# Patient Record
Sex: MALE | Race: WHITE | Employment: OTHER | URBAN - METROPOLITAN AREA
[De-identification: names, ages, dates, MRNs, and addresses within clinical notes are randomized per-mention and may not be internally consistent; named-entity substitution may affect disease eponyms.]

---

## 2020-02-17 ENCOUNTER — TELEPHONE (OUTPATIENT)
Dept: PHARMACY | Age: 70
End: 2020-02-17

## 2020-02-17 NOTE — TELEPHONE ENCOUNTER
Received signed referral from Dr Stanley Reese , pt transferring from [facility] . Scheduled initial appt on 3/12 asked pt to arrive 15 minutes early to register and bring list of medications.

## 2020-03-12 ENCOUNTER — ANTI-COAG VISIT (OUTPATIENT)
Dept: PHARMACY | Age: 70
End: 2020-03-12
Payer: MEDICAID

## 2020-03-12 PROBLEM — I82.409 DVT OF LEG (DEEP VENOUS THROMBOSIS) (HCC): Status: ACTIVE | Noted: 2020-03-12

## 2020-03-12 PROBLEM — I26.99 PE (PULMONARY THROMBOEMBOLISM) (HCC): Status: ACTIVE | Noted: 2020-03-12

## 2020-03-12 LAB — INTERNATIONAL NORMALIZATION RATIO, POC: 1.7

## 2020-03-12 PROCEDURE — 85610 PROTHROMBIN TIME: CPT

## 2020-03-12 PROCEDURE — 99213 OFFICE O/P EST LOW 20 MIN: CPT

## 2020-03-12 RX ORDER — GABAPENTIN 300 MG/1
300 CAPSULE ORAL 2 TIMES DAILY
COMMUNITY
Start: 2020-03-05

## 2020-03-12 RX ORDER — CHOLECALCIFEROL (VITAMIN D3) 1250 MCG
3000 CAPSULE ORAL DAILY
COMMUNITY

## 2020-03-12 RX ORDER — WARFARIN SODIUM 10 MG/1
10 TABLET ORAL DAILY
COMMUNITY
Start: 2020-03-02

## 2020-03-12 RX ORDER — ATORVASTATIN CALCIUM 20 MG/1
20 TABLET, FILM COATED ORAL DAILY
COMMUNITY
Start: 2020-03-02

## 2020-03-12 NOTE — PROGRESS NOTES
Mr. Hemalatha Us is a 77 y.o. y/o male with history of DVT who presents today for anticoagulation monitoring and adjustment. Pertinent PMH: Has been on warfarin since 2015. Last [facility] Note: Results are therapeutic (INR goal = 2-3). Pt denies any specific changes at this time. Instructed patient to remain on warfarin 5 mg Sun & T and 10 mg all other days. Patient is to have his INR checked in 1 month (3/12/20). Unfortunately, our service will be closed by that time--pt intends to reach out to Dr. Elizabeth Landaverde office for further guidance, but was also provided with contact information for Trish Cunningham.     Patient Reported Findings:  Yes     No  [x]   []       Patient verifies current dosing regimen as listed- warfarin 5 mg Sun & Tues and 10 mg all other days  []   [x]       S/S bleeding/bruising/swelling/SOB- denies   []   [x]       Blood in urine or stool-denies   []   [x]       Procedures scheduled in the future at this time- April 13- colonoscopy, will need to call about holding/lovenox    []   [x]       Missed Dose- denies   []   [x]       Extra Dose- denies   []   [x]       Change in medications- updated med list   []   [x]       Change in health/diet/appetite- has greens twice weekly- broccoli and spinach and peas and sometimes iceburg lettuce, really likes dark green leafy vegetables- explained need for consistency---> had more greens than normal     []   [x]       Change in alcohol use- has a glass of wine occasionally   []   [x]       Change in activity  []   [x]       Hospital admission  []   [x]       Emergency department visit  []   [x]       Other complaints    Clinical Outcomes:  Yes     No  []   [x]       Major bleeding event  []   [x]       Thromboembolic event    Duration of warfarin Therapy: indefinite   INR Range:  2.0-3.0     Re-educated patient of signs and symptoms of bleeding and clotting, when to seek emergent care, the need to maintain a consistent diet and

## 2020-03-25 ENCOUNTER — TELEPHONE (OUTPATIENT)
Dept: PHARMACY | Age: 70
End: 2020-03-25

## 2020-03-25 NOTE — TELEPHONE ENCOUNTER
Called patient. Explained that we aren't seeing patients in the clinic until COVID-19 has passed then we will re-open. We are still asking patients to get their INRs checked by getting a lab draw at a designated lab ([facility]). We are asking that you get your lab drawn on the day of your appointment (time doesn't matter as long as you go during hours of operation), so please go sometime tomorrow to the lab to get your INR drawn. Then we will be contacting you via telephone with results and we will conduct the whole appointment over the telephone. Patient has selected [address] [place] [address] location to get his INR drawn. Agatha Ravi, PharmD, [facility]      Patient's line was busy, other line I was able to leave a . Will try again later.

## 2020-03-30 NOTE — TELEPHONE ENCOUNTER
Called patient re: lab draw for INR. He states he did not get the message from [employer] so, I explained the temporary closure of the clinic and the lab draw availability @ [facility] [place]. Patient agreed to go tomorrow to [facility] to get his INR drawn. Advised him to call with any questions or health changes.

## 2020-03-31 LAB — INR BLD: 2.2

## 2020-04-01 ENCOUNTER — ANTI-COAG VISIT (OUTPATIENT)
Dept: PHARMACY | Age: 70
End: 2020-04-01
Payer: MEDICAID

## 2020-04-01 PROCEDURE — 99211 OFF/OP EST MAY X REQ PHY/QHP: CPT

## 2020-04-01 NOTE — PROGRESS NOTES
Mr. Harjinder Coker is a 77 y.o. y/o male with history of DVT who presents today for anticoagulation monitoring and adjustment. Pertinent PMH: Has been on warfarin since 2015. Hx patient of [facility] clinic  Patient Reported Findings:  Yes     No  [x]   []       Patient verifies current dosing regimen as listed- warfarin 5 mg Sun & Tues and 10 mg all other days  []   [x]       S/S bleeding/bruising/swelling/SOB- denies   []   [x]       Blood in urine or stool-denies   []   [x]       Procedures scheduled in the future at this time- April 13- colonoscopy, will need to call about holding/lovenox --> cancelled d/t COVID19   []   [x]       Missed Dose- denies   []   [x]       Extra Dose- denies   []   [x]       Change in medications- updated med list   [x]   []       Change in health/diet/appetite- has greens twice weekly- broccoli and spinach and peas and sometimes iceburg lettuce, really likes dark green leafy vegetables- explained need for consistency---> had more greens than normal --> has been eating some greens recently      []   [x]       Change in alcohol use- has a glass of wine occasionally   []   [x]       Change in activity  []   [x]       Hospital admission  []   [x]       Emergency department visit  []   [x]       Other complaints    Clinical Outcomes:  Yes     No  []   [x]       Major bleeding event  []   [x]       Thromboembolic event    Duration of warfarin Therapy: indefinite   INR Range:  2.0-3.0     Has 10mg tablets of warfarin and uses a pillbox and takes in the morning. INR today is 2.2 via venipuncture     Continue weekly dose of 5 mg Sun & Tues and 10 mg all other days. Encouraged to maintain a consistency of vegetables/salads.   Recheck INR in 4 weeks, 4/28 at [facility] lab    Referring is Dr. Mandi Sullivan   INR (no units)   Date Value   03/31/2020 2.20   03/12/2020 1.7

## 2020-04-28 ENCOUNTER — ANTI-COAG VISIT (OUTPATIENT)
Dept: PHARMACY | Age: 70
End: 2020-04-28
Payer: MEDICAID

## 2020-04-28 LAB — INR BLD: 2.3

## 2020-04-28 PROCEDURE — 99211 OFF/OP EST MAY X REQ PHY/QHP: CPT

## 2020-05-28 ENCOUNTER — TELEPHONE (OUTPATIENT)
Dept: PHARMACY | Age: 70
End: 2020-05-28

## 2020-05-29 ENCOUNTER — ANTI-COAG VISIT (OUTPATIENT)
Dept: PHARMACY | Age: 70
End: 2020-05-29
Payer: MEDICAID

## 2020-05-29 LAB — INR BLD: 2.9

## 2020-05-29 PROCEDURE — 99211 OFF/OP EST MAY X REQ PHY/QHP: CPT

## 2020-05-29 NOTE — PROGRESS NOTES
[address]: No  Total # of Interventions Recommended: 0  - Maintenance Safety Lab Monitoring #: 1  Total Interventions Accepted: 0  Time Spent (min): 15    Soo JustinD

## 2020-06-17 ENCOUNTER — TELEPHONE (OUTPATIENT)
Dept: PHARMACY | Age: 70
End: 2020-06-17

## 2020-07-06 ENCOUNTER — ANTI-COAG VISIT (OUTPATIENT)
Dept: PHARMACY | Age: 70
End: 2020-07-06
Payer: MEDICAID

## 2020-07-06 VITALS — TEMPERATURE: 99.3 F

## 2020-07-06 LAB — INTERNATIONAL NORMALIZATION RATIO, POC: 2.2

## 2020-07-06 PROCEDURE — 85610 PROTHROMBIN TIME: CPT

## 2020-07-06 PROCEDURE — 99211 OFF/OP EST MAY X REQ PHY/QHP: CPT

## 2020-07-06 NOTE — PROGRESS NOTES
Only    PHSO: No  Total # of Interventions Recommended: 0  - Maintenance Safety Lab Monitoring #: 1  Total Interventions Accepted: 0  Time Spent (min): 15    Harleen Holden PharmD

## 2020-08-03 ENCOUNTER — ANTI-COAG VISIT (OUTPATIENT)
Dept: PHARMACY | Age: 70
End: 2020-08-03
Payer: MEDICARE

## 2020-08-03 VITALS — TEMPERATURE: 96.8 F

## 2020-08-03 LAB — INTERNATIONAL NORMALIZATION RATIO, POC: 2.5

## 2020-08-03 PROCEDURE — 85610 PROTHROMBIN TIME: CPT

## 2020-08-03 PROCEDURE — 99211 OFF/OP EST MAY X REQ PHY/QHP: CPT

## 2020-08-31 ENCOUNTER — ANTI-COAG VISIT (OUTPATIENT)
Dept: PHARMACY | Age: 70
End: 2020-08-31
Payer: MEDICARE

## 2020-08-31 VITALS — TEMPERATURE: 97.7 F

## 2020-08-31 LAB — INTERNATIONAL NORMALIZATION RATIO, POC: 2.3

## 2020-08-31 PROCEDURE — 99211 OFF/OP EST MAY X REQ PHY/QHP: CPT

## 2020-08-31 PROCEDURE — 85610 PROTHROMBIN TIME: CPT

## 2020-08-31 NOTE — PROGRESS NOTES
Mr. Ignacia Miranda is a 77 y.o. y/o male with history of DVT who presents today for anticoagulation monitoring and adjustment. Pertinent PMH: Has been on warfarin since 2015. Hx patient of [facility] clinic  Patient Reported Findings:  Yes     No  [x]   []       Patient verifies current dosing regimen as listed- warfarin 5 mg Sun & Tues and 10 mg all other days---> confirms dose   []   [x]       S/S bleeding/bruising/swelling/SOB- denies   []   [x]       Blood in urine or stool-denies   []   [x]       Procedures scheduled in the future at this time- April 13- colonoscopy, will need to call about holding/lovenox --> cancelled d/t COVID19   []   [x]       Missed Dose- denies   []   [x]       Extra Dose- denies   []   [x]       Change in medications- denies   []   [x]       Change in health/diet/appetite- has greens twice weekly- broccoli and spinach and peas and sometimes iceburg lettuce, really likes dark green leafy vegetables- explained need for consistency---> had more greens than normal---> continue with greens        []   [x]       Change in alcohol use- has a glass of wine occasionally --> has a beer once a month   []   [x]       Change in activity  []   [x]       Hospital admission  []   [x]       Emergency department visit  []   [x]       Other complaints    Clinical Outcomes:  Yes     No  []   [x]       Major bleeding event  []   [x]       Thromboembolic event    Duration of warfarin Therapy: indefinite   INR Range:  2.0-3.0     Has 10mg tablets of warfarin and uses a pillbox and takes in the morning. INR today is 2.3  Continue weekly dose of 5 mg Sun & Tues and 10 mg all other days. Encouraged to maintain a consistency of vegetables/salads.   Recheck INR in 4 weeks, 9/28    Referring is Dr. Ashley Mcknight   INR (no units)   Date Value   08/03/2020 2.5   07/06/2020 2.2   05/29/2020 2.90   04/28/2020 2.30   03/31/2020 2.20   03/12/2020 1.7     CLINICAL PHARMACY CONSULT: MED RECONCILIATION/REVIEW

## 2020-10-01 ENCOUNTER — TELEPHONE (OUTPATIENT)
Dept: PHARMACY | Age: 70
End: 2020-10-01

## 2020-10-05 ENCOUNTER — ANTI-COAG VISIT (OUTPATIENT)
Dept: PHARMACY | Age: 70
End: 2020-10-05
Payer: MEDICARE

## 2020-10-05 VITALS — TEMPERATURE: 97.3 F

## 2020-10-05 LAB — INTERNATIONAL NORMALIZATION RATIO, POC: 3.4

## 2020-10-05 PROCEDURE — 85610 PROTHROMBIN TIME: CPT

## 2020-10-05 PROCEDURE — 99211 OFF/OP EST MAY X REQ PHY/QHP: CPT

## 2020-10-05 NOTE — PROGRESS NOTES
Mr. Ad Meléndez is a 77 y.o. y/o male with history of DVT who presents today for anticoagulation monitoring and adjustment. Pertinent PMH: Has been on warfarin since 2015. Hx patient of [facility] clinic  Patient Reported Findings:  Yes     No  [x]   []       Patient verifies current dosing regimen as listed- warfarin 5 mg Sun & Tues and 10 mg all other days---> confirms dose   []   [x]       S/S bleeding/bruising/swelling/SOB- denies   []   [x]       Blood in urine or stool-denies   []   [x]       Procedures scheduled in the future at this time- April 13- colonoscopy, will need to call about holding/lovenox --> cancelled d/t COVID19   []   [x]       Missed Dose- denies   []   [x]       Extra Dose- denies   []   [x]       Change in medications- denies --> no changes  []   [x]       Change in health/diet/appetite- has greens twice weekly- broccoli and spinach and peas and sometimes iceburg lettuce, really likes dark green leafy vegetables- explained need for consistency---> had more greens than normal---> continue with greens --> patient to increase to 2 days of greens per week   []   [x]       Change in alcohol use- has a glass of wine occasionally --> has a beer once a month   []   [x]       Change in activity  []   [x]       Hospital admission  []   [x]       Emergency department visit  []   [x]       Other complaints    Clinical Outcomes:  Yes     No  []   [x]       Major bleeding event  []   [x]       Thromboembolic event    Duration of warfarin Therapy: indefinite   INR Range:  2.0-3.0     Has 10mg tablets of warfarin and uses a pillbox and takes in the morning. INR today is 3.4 d/t unknown etiology  Hold tomorrow, then continue weekly dose of 5 mg Sun & Tues and 10 mg all other days. Patient adding one more day of greens in per week starting today, assess at next visit. Encouraged to maintain a consistency of vegetables/salads.   Recheck INR in 3 weeks, 10/26    Referring is Dr. Ursula Haynes

## 2020-11-04 ENCOUNTER — TELEPHONE (OUTPATIENT)
Dept: PHARMACY | Age: 70
End: 2020-11-04

## 2020-11-05 ENCOUNTER — ANTI-COAG VISIT (OUTPATIENT)
Dept: PHARMACY | Age: 70
End: 2020-11-05
Payer: MEDICARE

## 2020-11-05 VITALS — TEMPERATURE: 97.8 F

## 2020-11-05 LAB — INTERNATIONAL NORMALIZATION RATIO, POC: 2.3

## 2020-11-05 PROCEDURE — 85610 PROTHROMBIN TIME: CPT

## 2020-11-05 PROCEDURE — 99211 OFF/OP EST MAY X REQ PHY/QHP: CPT

## 2020-11-05 NOTE — PROGRESS NOTES
Mr. Saira Brown is a 72 y.o. y/o male with history of DVT who presents today for anticoagulation monitoring and adjustment. Pertinent PMH: Has been on warfarin since 2015. Hx patient of [facility] clinic  Patient Reported Findings:  Yes     No  [x]   []       Patient verifies current dosing regimen as listed- warfarin 5 mg Sun & Tues and 10 mg all other days---> confirms dose   []   [x]       S/S bleeding/bruising/swelling/SOB- denies   []   [x]       Blood in urine or stool-denies   []   [x]       Procedures scheduled in the future at this time   []   [x]       Missed Dose- denies   []   [x]       Extra Dose- denies   []   [x]       Change in medications- denies --> no changes--> Vit C started 10/15 (may dec inr)  []   [x]       Change in health/diet/appetite- has greens twice weekly- broccoli and spinach and peas and sometimes iceburg lettuce, really likes dark green leafy vegetables- explained need for consistency---> had more greens than normal---> continue with greens --> patient to increase to 2 days of greens per week--> no change  []   [x]       Change in alcohol use- has a glass of wine occasionally --> has a beer once a month    []   [x]       Change in activity  []   [x]       Hospital admission  []   [x]       Emergency department visit  []   [x]       Other complaints    Clinical Outcomes:  Yes     No  []   [x]       Major bleeding event  []   [x]       Thromboembolic event    Duration of warfarin Therapy: indefinite   INR Range:  2.0-3.0     Has 10mg tablets of warfarin and uses a pillbox and takes in the morning. INR today is 2.3 today  Continue weekly dose of 5 mg Sun & Tues and 10 mg all other days. Encouraged to maintain a consistency of vegetables/salads.   Recheck INR in 4 weeks, 12/3    Referring is Dr. Sarkis Mas   INR (no units)   Date Value   11/05/2020 2.3   10/05/2020 3.4   08/31/2020 2.3   08/03/2020 2.5   05/29/2020 2.90   04/28/2020 2.30   03/31/2020 2.20     CLINICAL PHARMACY CONSULT: MED RECONCILIATION/REVIEW ADDENDUM    For Pharmacy Admin Tracking Only    PHSO: No  Total # of Interventions Recommended: 1  - Refills Provided #: 1  - Maintenance Safety Lab Monitoring #: 1  Total Interventions Accepted: 1  Time Spent (min): Sherly Stark, PharmD

## 2020-12-03 ENCOUNTER — ANTI-COAG VISIT (OUTPATIENT)
Dept: PHARMACY | Age: 70
End: 2020-12-03
Payer: MEDICARE

## 2020-12-03 VITALS — TEMPERATURE: 97.5 F

## 2020-12-03 LAB — INTERNATIONAL NORMALIZATION RATIO, POC: 3.4

## 2020-12-03 PROCEDURE — 85610 PROTHROMBIN TIME: CPT

## 2020-12-03 PROCEDURE — 99211 OFF/OP EST MAY X REQ PHY/QHP: CPT

## 2020-12-03 NOTE — PROGRESS NOTES
08/31/2020 2.3   05/29/2020 2.90   04/28/2020 2.30   03/31/2020 2.20     CLINICAL PHARMACY CONSULT: MED RECONCILIATION/REVIEW ADDENDUM    For Pharmacy Admin Tracking Only    PHSO: No  Total # of Interventions Recommended: 0  - Maintenance Safety Lab Monitoring #: 1  Total Interventions Accepted: 0  Time Spent (min): 15    Soo AndersonD

## 2020-12-24 ENCOUNTER — ANTI-COAG VISIT (OUTPATIENT)
Dept: PHARMACY | Age: 70
End: 2020-12-24
Payer: MEDICARE

## 2020-12-24 VITALS — TEMPERATURE: 96.4 F

## 2020-12-24 LAB — INTERNATIONAL NORMALIZATION RATIO, POC: 2.4

## 2020-12-24 PROCEDURE — 99211 OFF/OP EST MAY X REQ PHY/QHP: CPT

## 2020-12-24 PROCEDURE — 85610 PROTHROMBIN TIME: CPT

## 2020-12-24 NOTE — PROGRESS NOTES
Mr. Amarjit Marsh is a 72 y.o. y/o male with history of DVT who presents today for anticoagulation monitoring and adjustment. Pertinent PMH: Has been on warfarin since 2015. Hx patient of [facility] clinic  Patient Reported Findings:  Yes     No  [x]   []       Patient verifies current dosing regimen as listed- warfarin 5 mg Sun & Tues and 10 mg all other days---> confirms dose   []   [x]       S/S bleeding/bruising/swelling/SOB- denies   []   [x]       Blood in urine or stool-denies   []   [x]       Procedures scheduled in the future at this time   []   [x]       Missed Dose-   []   [x]       Extra Dose-   []   [x]       Change in medications- denies --> no changes--> Vit C started 10/15 (may dec inr)  []   [x]       Change in health/diet/appetite- has greens twice weekly- broccoli and spinach and peas and sometimes iceburg lettuce, really likes dark green leafy vegetables- explained need for consistency---> had more greens than normal---> continue with greens --> patient to increase to 2 days of greens per week--> eating greens 3 times a week and plans to continue, no NVD   []   [x]       Change in alcohol use- has a glass of wine occasionally --> has a beer once a month    []   [x]       Change in activity  []   [x]       Hospital admission  []   [x]       Emergency department visit  []   [x]       Other complaints    Clinical Outcomes:  Yes     No  []   [x]       Major bleeding event  []   [x]       Thromboembolic event    Duration of warfarin Therapy: indefinite   INR Range:  2.0-3.0     Has 10mg tablets of warfarin and uses a pillbox and takes in the morning. INR today is 2.4 after maintaining dose at last visit. Continue weekly dose of 5 mg Sun & Tues and 10 mg all other days. Encouraged to maintain a consistency of vegetables/salads.   Recheck INR in 4 weeks, 1/21    Referring is Dr. Sophia Gee   INR (no units)   Date Value   12/24/2020 2.4   12/03/2020 3.4   11/05/2020 2.3   10/05/2020 3.4 05/29/2020 2.90   04/28/2020 2.30   03/31/2020 2.20     CLINICAL PHARMACY CONSULT: MED RECONCILIATION/REVIEW ADDENDUM    For Pharmacy Admin Tracking Only    PHSO: No  Total # of Interventions Recommended: 0  - Maintenance Safety Lab Monitoring #: 1  Total Interventions Accepted: 0  Time Spent (min): 15    Soo AndersonD

## 2021-01-21 ENCOUNTER — ANTI-COAG VISIT (OUTPATIENT)
Dept: PHARMACY | Age: 71
End: 2021-01-21
Payer: MEDICARE

## 2021-01-21 DIAGNOSIS — I82.409 ACUTE DEEP VEIN THROMBOSIS (DVT) OF OTHER VEIN OF LOWER EXTREMITY, UNSPECIFIED LATERALITY (HCC): ICD-10-CM

## 2021-01-21 DIAGNOSIS — I26.99 PE (PULMONARY THROMBOEMBOLISM) (HCC): ICD-10-CM

## 2021-01-21 LAB — INTERNATIONAL NORMALIZATION RATIO, POC: 2.4

## 2021-01-21 PROCEDURE — 99211 OFF/OP EST MAY X REQ PHY/QHP: CPT

## 2021-01-21 PROCEDURE — 85610 PROTHROMBIN TIME: CPT

## 2021-01-21 RX ORDER — LISINOPRIL 5 MG/1
1 TABLET ORAL DAILY
COMMUNITY
Start: 2020-12-04

## 2021-01-21 NOTE — PROGRESS NOTES
Mr. Jone Driver is a 72 y.o. y/o male with history of DVT who presents today for anticoagulation monitoring and adjustment. Pertinent PMH: Has been on warfarin since 2015. Hx patient of [facility] clinic  Patient Reported Findings:  Yes     No  [x]   []       Patient verifies current dosing regimen as listed- warfarin 5 mg Sun & Tues and 10 mg all other days---> confirms dose   []   [x]       S/S bleeding/bruising/swelling/SOB- denies   []   [x]       Blood in urine or stool-denies   []   [x]       Procedures scheduled in the future at this time   []   [x]       Missed Dose-   []   [x]       Extra Dose-   [x]   []       Change in medications- Vit C started 10/15 (may dec inr) --> started lisinopril   []   [x]       Change in health/diet/appetite- has greens twice weekly- broccoli and spinach and peas and sometimes iceburg lettuce, really likes dark green leafy vegetables- explained need for consistency---> had more greens than normal---> continue with greens --> patient to increase to 2 days of greens per week--> eating greens 3 times a week and plans to continue, no NVD   []   [x]       Change in alcohol use- has a glass of wine occasionally --> has a beer once a month    []   [x]       Change in activity  []   [x]       Hospital admission  []   [x]       Emergency department visit  []   [x]       Other complaints    Clinical Outcomes:  Yes     No  []   [x]       Major bleeding event  []   [x]       Thromboembolic event    Duration of warfarin Therapy: indefinite   INR Range:  2.0-3.0     Has 10mg tablets of warfarin and uses a pillbox and takes in the morning. INR today is 2.4 again   Continue weekly dose of 5 mg Sun & Tues and 10 mg all other days. Encouraged to maintain a consistency of vegetables/salads.   Recheck INR in 4 weeks, 2/18    Referring is Dr. Thomas Martinez   INR (no units)   Date Value   12/24/2020 2.4   12/03/2020 3.4   11/05/2020 2.3   10/05/2020 3.4   05/29/2020 2.90   04/28/2020 2.30 03/31/2020 2.20     CLINICAL PHARMACY CONSULT: MED RECONCILIATION/REVIEW ADDENDUM    For Pharmacy Admin Tracking Only    PHSO: No  Total # of Interventions Recommended: 1  - Updated Order #: 1 Updated Order Reason(s):  Other  - Maintenance Safety Lab Monitoring #: 1  Total Interventions Accepted: 1  Time Spent (min): 15    Yoli Espinosa PharmD

## 2021-02-18 ENCOUNTER — ANTI-COAG VISIT (OUTPATIENT)
Dept: PHARMACY | Age: 71
End: 2021-02-18
Payer: MEDICARE

## 2021-02-18 VITALS — TEMPERATURE: 97.1 F

## 2021-02-18 DIAGNOSIS — I26.99 PE (PULMONARY THROMBOEMBOLISM) (HCC): ICD-10-CM

## 2021-02-18 DIAGNOSIS — I82.409 ACUTE DEEP VEIN THROMBOSIS (DVT) OF OTHER VEIN OF LOWER EXTREMITY, UNSPECIFIED LATERALITY (HCC): ICD-10-CM

## 2021-02-18 LAB — INTERNATIONAL NORMALIZATION RATIO, POC: 2.8

## 2021-02-18 PROCEDURE — 99211 OFF/OP EST MAY X REQ PHY/QHP: CPT

## 2021-02-18 PROCEDURE — 85610 PROTHROMBIN TIME: CPT

## 2021-02-18 NOTE — PROGRESS NOTES
Mr. Tank Barone is a 72 y.o. y/o male with history of DVT who presents today for anticoagulation monitoring and adjustment. Pertinent PMH: Has been on warfarin since 2015. Hx patient of [facility] clinic  Patient Reported Findings:  Yes     No  [x]   []       Patient verifies current dosing regimen as listed- warfarin 5 mg Sun & Tues and 10 mg all other days---> confirms dose   []   [x]       S/S bleeding/bruising/swelling/SOB- denies   []   [x]       Blood in urine or stool-denies   []   [x]       Procedures scheduled in the future at this time   []   [x]       Missed Dose-   []   [x]       Extra Dose-   []   [x]       Change in medications- Vit C started 10/15 (may dec inr) --> started lisinopril --> no changes    []   [x]       Change in health/diet/appetite- has greens twice weekly- broccoli and spinach and peas and sometimes iceburg lettuce, really likes dark green leafy vegetables- explained need for consistency---> had more greens than normal---> continue with greens --> patient to increase to 2 days of greens per week--> eating greens 3 times a week and plans to continue, no NVD   []   [x]       Change in alcohol use- has a glass of wine occasionally --> has a beer once a month    []   [x]       Change in activity  []   [x]       Hospital admission  []   [x]       Emergency department visit  []   [x]       Other complaints    Clinical Outcomes:  Yes     No  []   [x]       Major bleeding event  []   [x]       Thromboembolic event    Duration of warfarin Therapy: indefinite   INR Range:  2.0-3.0     Has 10mg tablets of warfarin and uses a pillbox and takes in the morning. INR today is 2.8  Continue weekly dose of 5 mg Sun & Tues and 10 mg all other days. Encouraged to maintain a consistency of vegetables/salads.   Refilled warfarin at op pharmacy  Recheck INR in 4 weeks, 3/18    Referring is Dr. Palomo Krsihnan   INR (no units)   Date Value   01/21/2021 2.4   12/24/2020 2.4   12/03/2020 3.4 11/05/2020 2.3   05/29/2020 2.90   04/28/2020 2.30   03/31/2020 2.20     CLINICAL PHARMACY CONSULT: MED RECONCILIATION/REVIEW ADDENDUM    For Pharmacy Admin Tracking Only    PHSO: No  Total # of Interventions Recommended: 1  - Refills Provided #: 1  - Maintenance Safety Lab Monitoring #: 1  Total Interventions Accepted: 1  Time Spent (min): 15    Soo RaeD

## 2021-03-01 ENCOUNTER — IMMUNIZATION (OUTPATIENT)
Dept: PRIMARY CARE CLINIC | Age: 71
End: 2021-03-01
Payer: MEDICARE

## 2021-03-01 PROCEDURE — 0011A COVID-19, MODERNA VACCINE 100MCG/0.5ML DOSE: CPT | Performed by: FAMILY MEDICINE

## 2021-03-01 PROCEDURE — 91301 COVID-19, MODERNA VACCINE 100MCG/0.5ML DOSE: CPT | Performed by: FAMILY MEDICINE

## 2021-03-25 ENCOUNTER — TELEPHONE (OUTPATIENT)
Dept: PHARMACY | Age: 71
End: 2021-03-25

## 2021-03-29 ENCOUNTER — ANTI-COAG VISIT (OUTPATIENT)
Dept: PHARMACY | Age: 71
End: 2021-03-29
Payer: MEDICARE

## 2021-03-29 ENCOUNTER — IMMUNIZATION (OUTPATIENT)
Dept: PRIMARY CARE CLINIC | Age: 71
End: 2021-03-29
Payer: MEDICARE

## 2021-03-29 DIAGNOSIS — I82.409 ACUTE DEEP VEIN THROMBOSIS (DVT) OF OTHER VEIN OF LOWER EXTREMITY, UNSPECIFIED LATERALITY (HCC): ICD-10-CM

## 2021-03-29 DIAGNOSIS — I26.99 PE (PULMONARY THROMBOEMBOLISM) (HCC): ICD-10-CM

## 2021-03-29 LAB — INTERNATIONAL NORMALIZATION RATIO, POC: 3.1

## 2021-03-29 PROCEDURE — 91301 COVID-19, MODERNA VACCINE 100MCG/0.5ML DOSE: CPT | Performed by: FAMILY MEDICINE

## 2021-03-29 PROCEDURE — 0012A COVID-19, MODERNA VACCINE 100MCG/0.5ML DOSE: CPT | Performed by: FAMILY MEDICINE

## 2021-03-29 PROCEDURE — 99211 OFF/OP EST MAY X REQ PHY/QHP: CPT

## 2021-03-29 PROCEDURE — 85610 PROTHROMBIN TIME: CPT

## 2021-03-29 NOTE — PROGRESS NOTES
Mr. Graciela Alex is a 72 y.o. y/o male with history of DVT who presents today for anticoagulation monitoring and adjustment. Pertinent PMH: Has been on warfarin since 2015. Hx patient of [facility] clinic  Patient Reported Findings:  Yes     No  [x]   []       Patient verifies current dosing regimen as listed- warfarin 5 mg Sun & Tues and 10 mg all other days---> confirms dose   []   [x]       S/S bleeding/bruising/swelling/SOB- denies   []   [x]       Blood in urine or stool-denies   []   [x]       Procedures scheduled in the future at this time   []   [x]       Missed Dose-   []   [x]       Extra Dose-   []   [x]       Change in medications- no changes    []   [x]       Change in health/diet/appetite- has greens twice weekly- broccoli and spinach and peas and sometimes iceburg lettuce, really likes dark green leafy vegetables- explained need for consistency---> had more greens than normal---> continue with greens --> patient to increase to 2 days of greens per week--> eating greens 3 times a week and plans to continue, no NVD   []   [x]       Change in alcohol use- has a glass of wine occasionally --> has a beer once a month    []   [x]       Change in activity  []   [x]       Hospital admission  []   [x]       Emergency department visit  []   [x]       Other complaints    Clinical Outcomes:  Yes     No  []   [x]       Major bleeding event  []   [x]       Thromboembolic event    Duration of warfarin Therapy: indefinite   INR Range:  2.0-3.0     Has 10mg tablets of warfarin and uses a pillbox and takes in the morning. INR today is 3.1  Continue weekly dose of 5 mg Sun & Tues and 10 mg all other days. Encouraged to maintain a consistency of vegetables/salads.   Recheck INR in 4 weeks, 4/26    Referring is Dr. Latha Neely   INR (no units)   Date Value   02/18/2021 2.8   01/21/2021 2.4   12/24/2020 2.4   12/03/2020 3.4   05/29/2020 2.90   04/28/2020 2.30   03/31/2020 2.20     CLINICAL PHARMACY CONSULT:

## 2021-05-03 ENCOUNTER — TELEPHONE (OUTPATIENT)
Dept: PHARMACY | Age: 71
End: 2021-05-03

## 2021-05-04 ENCOUNTER — ANTI-COAG VISIT (OUTPATIENT)
Dept: PHARMACY | Age: 71
End: 2021-05-04
Payer: MEDICARE

## 2021-05-04 DIAGNOSIS — I26.99 PE (PULMONARY THROMBOEMBOLISM) (HCC): ICD-10-CM

## 2021-05-04 DIAGNOSIS — I82.409 DEEP VEIN THROMBOSIS (DVT) OF LOWER EXTREMITY, UNSPECIFIED CHRONICITY, UNSPECIFIED LATERALITY, UNSPECIFIED VEIN (HCC): ICD-10-CM

## 2021-05-04 LAB — INTERNATIONAL NORMALIZATION RATIO, POC: 2.4

## 2021-05-04 PROCEDURE — 99211 OFF/OP EST MAY X REQ PHY/QHP: CPT

## 2021-05-04 PROCEDURE — 85610 PROTHROMBIN TIME: CPT

## 2021-05-04 NOTE — TELEPHONE ENCOUNTER
Warfarin prescription phoned into [facility] to [facility] under Dr. Prerna Fisher  Warfarin 10 mg tabs  Take 5 mg (10 mg x 0.5) every Sun, Tue; 10 mg (10 mg x 1) all other days  90 days   2 refills  Joyce Christopher, PharmD, [facility]

## 2021-05-04 NOTE — PROGRESS NOTES
Mr. Stephanie Jimenez is a 72 y.o. y/o male with history of DVT who presents today for anticoagulation monitoring and adjustment. Pertinent PMH: Has been on warfarin since 2015. Hx patient of [facility] clinic  Patient Reported Findings:  Yes     No  [x]   []       Patient verifies current dosing regimen as listed- warfarin 5 mg Sun & Tues and 10 mg all other days---> confirms dose   []   [x]       S/S bleeding/bruising/swelling/SOB- denies   []   [x]       Blood in urine or stool-denies   []   [x]       Procedures scheduled in the future at this time   []   [x]       Missed Dose-   []   [x]       Extra Dose-   []   [x]       Change in medications- no changes    []   [x]       Change in health/diet/appetite- has greens twice weekly- broccoli and spinach and peas and sometimes iceburg lettuce, really likes dark green leafy vegetables- explained need for consistency---> had more greens than normal---> continue with greens --> patient to increase to 2 days of greens per week--> eating greens 3 times a week and plans to continue, no NVD---> not as many greens recently, no NVD   []   [x]       Change in alcohol use- has a glass of wine occasionally --> has a beer once a month    []   [x]       Change in activity  []   [x]       Hospital admission  []   [x]       Emergency department visit  []   [x]       Other complaints    Clinical Outcomes:  Yes     No  []   [x]       Major bleeding event  []   [x]       Thromboembolic event    Duration of warfarin Therapy: indefinite   INR Range:  2.0-3.0     Has 10mg tablets of warfarin and uses a pillbox and takes in the morning. INR today is 2.4  Continue weekly dose of 5 mg Sun & Tues and 10 mg all other days. Encouraged to maintain a consistency of vegetables/salads. RF called into [facility].    Recheck INR in 4 weeks, 6/1    Referring is Dr. Lisset Gibbons   INR (no units)   Date Value   05/04/2021 2.4   03/29/2021 3.1   02/18/2021 2.8   01/21/2021 2.4   05/29/2020 2.90

## 2021-06-01 ENCOUNTER — ANTI-COAG VISIT (OUTPATIENT)
Dept: PHARMACY | Age: 71
End: 2021-06-01
Payer: MEDICARE

## 2021-06-01 DIAGNOSIS — I26.99 PE (PULMONARY THROMBOEMBOLISM) (HCC): ICD-10-CM

## 2021-06-01 DIAGNOSIS — I82.409 DEEP VEIN THROMBOSIS (DVT) OF LOWER EXTREMITY, UNSPECIFIED CHRONICITY, UNSPECIFIED LATERALITY, UNSPECIFIED VEIN (HCC): Primary | ICD-10-CM

## 2021-06-01 LAB — INTERNATIONAL NORMALIZATION RATIO, POC: 3.1

## 2021-06-01 PROCEDURE — 85610 PROTHROMBIN TIME: CPT

## 2021-06-01 PROCEDURE — 99211 OFF/OP EST MAY X REQ PHY/QHP: CPT

## 2021-06-01 NOTE — PROGRESS NOTES
Mr. Monique Aponte is a 72 y.o. y/o male with history of DVT who presents today for anticoagulation monitoring and adjustment. Pertinent PMH: Has been on warfarin since 2015. Hx patient of [facility] clinic  Patient Reported Findings:  Yes     No  [x]   []       Patient verifies current dosing regimen as listed- warfarin 5 mg Sun & Tues and 10 mg all other days---> confirms dose   []   [x]       S/S bleeding/bruising/swelling/SOB- denies   []   [x]       Blood in urine or stool-denies   []   [x]       Procedures scheduled in the future at this time   []   [x]       Missed Dose-   []   [x]       Extra Dose-   []   [x]       Change in medications- no changes    []   [x]       Change in health/diet/appetite- has greens twice weekly- broccoli and spinach and peas and sometimes iceburg lettuce, really likes dark green leafy vegetables- explained need for consistency---> had more greens than normal---> continue with greens --> patient to increase to 2 days of greens per week--> eating greens 3 times a week and plans to continue, no NVD---> not as many greens recently, no NVD---> went back to normal diet, no NVD   []   [x]       Change in alcohol use- has a glass of wine occasionally --> has a beer once a month    []   [x]       Change in activity  []   [x]       Hospital admission  []   [x]       Emergency department visit  []   [x]       Other complaints    Clinical Outcomes:  Yes     No  []   [x]       Major bleeding event  []   [x]       Thromboembolic event    Duration of warfarin Therapy: indefinite   INR Range:  2.0-3.0     Has 10mg tablets of warfarin and uses a pillbox and takes in the morning. INR today is 3.1  Continue weekly dose of 5 mg Sun & Tues and 10 mg all other days. Encouraged to maintain a consistency of vegetables/salads.   Recheck INR in 4 weeks, 6/29    Referring is Dr. Chester Bowers   INR (no units)   Date Value   06/01/2021 3.1   05/04/2021 2.4   03/29/2021 3.1   02/18/2021 2.8 05/29/2020 2.90   04/28/2020 2.30   03/31/2020 2.20     For Pharmacy Admin Tracking Only     Total # of Interventions Recommended: 0   Total # of Interventions Accepted: 0   Time Spent (min): 15

## 2021-07-08 ENCOUNTER — TELEPHONE (OUTPATIENT)
Dept: PHARMACY | Age: 71
End: 2021-07-08

## 2021-07-12 ENCOUNTER — ANTI-COAG VISIT (OUTPATIENT)
Dept: PHARMACY | Age: 71
End: 2021-07-12
Payer: MEDICARE

## 2021-07-12 DIAGNOSIS — I26.99 PE (PULMONARY THROMBOEMBOLISM) (HCC): ICD-10-CM

## 2021-07-12 DIAGNOSIS — I82.409 DEEP VEIN THROMBOSIS (DVT) OF LOWER EXTREMITY, UNSPECIFIED CHRONICITY, UNSPECIFIED LATERALITY, UNSPECIFIED VEIN (HCC): Primary | ICD-10-CM

## 2021-07-12 LAB — INTERNATIONAL NORMALIZATION RATIO, POC: 2.3

## 2021-07-12 PROCEDURE — 85610 PROTHROMBIN TIME: CPT

## 2021-07-12 PROCEDURE — 99211 OFF/OP EST MAY X REQ PHY/QHP: CPT

## 2021-07-12 NOTE — PROGRESS NOTES
Mr. Serafin Granados is a 72 y.o. y/o male with history of DVT who presents today for anticoagulation monitoring and adjustment. Pertinent PMH: Has been on warfarin since 2015. Hx patient of [facility] clinic  Patient Reported Findings:  Yes     No  [x]   []       Patient verifies current dosing regimen as listed- warfarin 5 mg Sun & Tues and 10 mg all other days---> confirms dose   []   [x]       S/S bleeding/bruising/swelling/SOB- denies   []   [x]       Blood in urine or stool-denies   []   [x]       Procedures scheduled in the future at this time   []   [x]       Missed Dose-   []   [x]       Extra Dose-   []   [x]       Change in medications- no changes    []   [x]       Change in health/diet/appetite- has greens twice weekly- broccoli and spinach and peas and sometimes iceburg lettuce, really likes dark green leafy vegetables- explained need for consistency---> had more greens than normal---> continue with greens --> patient to increase to 2 days of greens per week--> eating greens 3 times a week and plans to continue, no NVD---> not as many greens recently, no NVD---> went back to normal diet, no NVD---> no changes   []   [x]       Change in alcohol use- has a glass of wine occasionally --> has a beer once a month    []   [x]       Change in activity  []   [x]       Hospital admission  []   [x]       Emergency department visit  []   [x]       Other complaints    Clinical Outcomes:  Yes     No  []   [x]       Major bleeding event  []   [x]       Thromboembolic event    Duration of warfarin Therapy: indefinite   INR Range:  2.0-3.0     Has 10mg tablets of warfarin and uses a pillbox and takes in the morning. INR today is 2.3  Continue weekly dose of 5 mg Sun & Tues and 10 mg all other days. Encouraged to maintain a consistency of vegetables/salads.   Recheck INR in 4 weeks, 8/9    Referring is Dr. Carrie Mistry   INR (no units)   Date Value   07/12/2021 2.3   06/01/2021 3.1   05/04/2021 2.4   03/29/2021 3.1   05/29/2020 2.90   04/28/2020 2.30   03/31/2020 2.20     For Pharmacy Admin Tracking Only     Total # of Interventions Recommended: 0   Total # of Interventions Accepted: 0   Time Spent (min): 15

## 2021-08-09 ENCOUNTER — ANTI-COAG VISIT (OUTPATIENT)
Dept: PHARMACY | Age: 71
End: 2021-08-09
Payer: MEDICARE

## 2021-08-09 DIAGNOSIS — I26.99 PE (PULMONARY THROMBOEMBOLISM) (HCC): ICD-10-CM

## 2021-08-09 DIAGNOSIS — I82.409 DEEP VEIN THROMBOSIS (DVT) OF LOWER EXTREMITY, UNSPECIFIED CHRONICITY, UNSPECIFIED LATERALITY, UNSPECIFIED VEIN (HCC): Primary | ICD-10-CM

## 2021-08-09 LAB — INTERNATIONAL NORMALIZATION RATIO, POC: 2.7

## 2021-08-09 PROCEDURE — 99211 OFF/OP EST MAY X REQ PHY/QHP: CPT

## 2021-08-09 PROCEDURE — 85610 PROTHROMBIN TIME: CPT

## 2021-08-09 NOTE — PROGRESS NOTES
Mr. Michelle Moralez is a 72 y.o. y/o male with history of DVT who presents today for anticoagulation monitoring and adjustment. Pertinent PMH: Has been on warfarin since 2015. Hx patient of [facility] clinic  Patient Reported Findings:  Yes     No  [x]   []       Patient verifies current dosing regimen as listed- warfarin 5 mg Sun & Tues and 10 mg all other days---> confirms dose   []   [x]       S/S bleeding/bruising/swelling/SOB- denies   []   [x]       Blood in urine or stool-denies   []   [x]       Procedures scheduled in the future at this time   []   [x]       Missed Dose-   []   [x]       Extra Dose-   []   [x]       Change in medications- no changes    []   [x]       Change in health/diet/appetite- has greens twice weekly- broccoli and spinach and peas and sometimes iceburg lettuce, really likes dark green leafy vegetables- explained need for consistency---> had more greens than normal---> continue with greens --> patient to increase to 2 days of greens per week--> eating greens 3 times a week and plans to continue, no NVD---> not as many greens recently, no NVD---> went back to normal diet, no NVD---> no changes, no NVD   []   [x]       Change in alcohol use- has a glass of wine occasionally --> has a beer once a month    []   [x]       Change in activity  []   [x]       Hospital admission  []   [x]       Emergency department visit  []   [x]       Other complaints    Clinical Outcomes:  Yes     No  []   [x]       Major bleeding event  []   [x]       Thromboembolic event    Duration of warfarin Therapy: indefinite   INR Range:  2.0-3.0     Has 10mg tablets of warfarin and uses a pillbox and takes in the morning. INR today is 2.7  Continue weekly dose of 5 mg Sun & Tues and 10 mg all other days. Encouraged to maintain a consistency of vegetables/salads. RF called into [facility].   Recheck INR in 4 weeks, 9/7 dt holiday     Referring is Dr. Noah Bliss   INR (no units)   Date Value   08/09/2021 2.7 07/12/2021 2.3   06/01/2021 3.1   05/04/2021 2.4   05/29/2020 2.90   04/28/2020 2.30   03/31/2020 2.20   For Pharmacy Admin Tracking Only     Intervention Detail: Refill(s) Provided   Total # of Interventions Recommended: 1   Total # of Interventions Accepted: 1   Time Spent (min): 15

## 2021-09-07 ENCOUNTER — ANTI-COAG VISIT (OUTPATIENT)
Dept: PHARMACY | Age: 71
End: 2021-09-07
Payer: MEDICARE

## 2021-09-07 DIAGNOSIS — I82.409 DEEP VEIN THROMBOSIS (DVT) OF LOWER EXTREMITY, UNSPECIFIED CHRONICITY, UNSPECIFIED LATERALITY, UNSPECIFIED VEIN (HCC): Primary | ICD-10-CM

## 2021-09-07 DIAGNOSIS — I26.99 PE (PULMONARY THROMBOEMBOLISM) (HCC): ICD-10-CM

## 2021-09-07 LAB — INTERNATIONAL NORMALIZATION RATIO, POC: 3.6

## 2021-09-07 PROCEDURE — 99212 OFFICE O/P EST SF 10 MIN: CPT

## 2021-09-07 PROCEDURE — 85610 PROTHROMBIN TIME: CPT

## 2021-09-07 NOTE — PROGRESS NOTES
Mr. Deena Patino is a 72 y.o. y/o male with history of DVT who presents today for anticoagulation monitoring and adjustment. Pertinent PMH: Has been on warfarin since 2015. Hx patient of [facility] clinic  Patient Reported Findings:  Yes     No  [x]   []       Patient verifies current dosing regimen as listed- warfarin 5 mg Sun & Tues and 10 mg all other days---> confirms dose   []   [x]       S/S bleeding/bruising/swelling/SOB- denies   []   [x]       Blood in urine or stool-denies   []   [x]       Procedures scheduled in the future at this time   []   [x]       Missed Dose-   []   [x]       Extra Dose-   []   [x]       Change in medications- no changes    [x]   []       Change in health/diet/appetite- has greens twice weekly- broccoli and spinach and peas and sometimes iceburg lettuce, really likes dark green leafy vegetables- explained need for consistency---> continue with greens --> patient to increase to 2 days of greens per week--> eating greens 3 times a week and plans to continue, no NVD---> not as many greens recently, no NVD---> went back to normal diet, no NVD---> had tomatoes and asparagus once and broccoli once in the last week   []   [x]       Change in alcohol use- has a glass of wine occasionally --> has a beer once a month --> no alcohol    []   [x]       Change in activity  []   [x]       Hospital admission  []   [x]       Emergency department visit  []   [x]       Other complaints    Clinical Outcomes:  Yes     No  []   [x]       Major bleeding event  []   [x]       Thromboembolic event    Duration of warfarin Therapy: indefinite   INR Range:  2.0-3.0     Has 10mg tablets of warfarin and uses a pillbox and takes in the morning. INR today is 3.6  Hold dose tonight then continue weekly dose of 5 mg Sun & Tues and 10 mg all other days. Encouraged to maintain a consistency of vegetables/salads.   Recheck INR in 2 weeks,  9/21    Referring is Dr. Shara Lakhani   INR (no units)   Date Value 08/09/2021 2.7   07/12/2021 2.3   06/01/2021 3.1   05/04/2021 2.4   05/29/2020 2.90   04/28/2020 2.30   03/31/2020 2.20       For Pharmacy Admin Tracking Only     Intervention Detail: Dose Adjustment: 1, reason: Therapy Optimization   Total # of Interventions Recommended: 1   Total # of Interventions Accepted: 1   Time Spent (min): 15

## 2021-09-21 ENCOUNTER — ANTI-COAG VISIT (OUTPATIENT)
Dept: PHARMACY | Age: 71
End: 2021-09-21
Payer: MEDICARE

## 2021-09-21 DIAGNOSIS — I26.99 PE (PULMONARY THROMBOEMBOLISM) (HCC): ICD-10-CM

## 2021-09-21 DIAGNOSIS — I82.409 DEEP VEIN THROMBOSIS (DVT) OF LOWER EXTREMITY, UNSPECIFIED CHRONICITY, UNSPECIFIED LATERALITY, UNSPECIFIED VEIN (HCC): Primary | ICD-10-CM

## 2021-09-21 LAB — INTERNATIONAL NORMALIZATION RATIO, POC: 2.6

## 2021-09-21 PROCEDURE — 99211 OFF/OP EST MAY X REQ PHY/QHP: CPT

## 2021-09-21 PROCEDURE — 85610 PROTHROMBIN TIME: CPT

## 2021-09-21 NOTE — PROGRESS NOTES
Mr. Orion Horta is a 72 y.o. y/o male with history of DVT who presents today for anticoagulation monitoring and adjustment. Pertinent PMH: Has been on warfarin since 2015. Hx patient of [facility] clinic  Patient Reported Findings:  Yes     No  [x]   []       Patient verifies current dosing regimen as listed- warfarin 5 mg Sun & Tues and 10 mg all other days---> confirms dose   []   [x]       S/S bleeding/bruising/swelling/SOB- denies   []   [x]       Blood in urine or stool-denies   []   [x]       Procedures scheduled in the future at this time   []   [x]       Missed Dose-   []   [x]       Extra Dose-   []   [x]       Change in medications- no changes    []   [x]       Change in health/diet/appetite- has greens twice weekly- broccoli and spinach and peas and sometimes iceburg lettuce, really likes dark green leafy vegetables- explained need for consistency---> continue with greens --> patient to increase to 2 days of greens per week--> eating greens 3 times a week and plans to continue, no NVD---> not as many greens recently, no NVD---> went back to normal diet, no NVD---> had tomatoes and asparagus once and broccoli once in the last week --> no changes  []   [x]       Change in alcohol use- has a glass of wine occasionally --> has a beer once a month --> no alcohol    []   [x]       Change in activity  []   [x]       Hospital admission  []   [x]       Emergency department visit  []   [x]       Other complaints    Clinical Outcomes:  Yes     No  []   [x]       Major bleeding event  []   [x]       Thromboembolic event    Duration of warfarin Therapy: indefinite   INR Range:  2.0-3.0     Has 10mg tablets of warfarin and uses a pillbox and takes in the morning. INR today is 2.6  Continue weekly dose of 5mg on Sun and Tues and 10mg all other days. Encouraged to maintain a consistency of vegetables/salads.   Recheck INR in 4 weeks, 10/19     Referring is Dr. Paul Dwyer   INR (no units)   Date Value

## 2021-10-19 ENCOUNTER — ANTI-COAG VISIT (OUTPATIENT)
Dept: PHARMACY | Age: 71
End: 2021-10-19
Payer: COMMERCIAL

## 2021-10-19 DIAGNOSIS — I26.99 PE (PULMONARY THROMBOEMBOLISM) (HCC): ICD-10-CM

## 2021-10-19 DIAGNOSIS — I82.409 DEEP VEIN THROMBOSIS (DVT) OF LOWER EXTREMITY, UNSPECIFIED CHRONICITY, UNSPECIFIED LATERALITY, UNSPECIFIED VEIN (HCC): Primary | ICD-10-CM

## 2021-10-19 LAB — INTERNATIONAL NORMALIZATION RATIO, POC: 2.6

## 2021-10-19 PROCEDURE — 99211 OFF/OP EST MAY X REQ PHY/QHP: CPT

## 2021-10-19 PROCEDURE — 85610 PROTHROMBIN TIME: CPT

## 2021-10-19 NOTE — PROGRESS NOTES
Mr. Gail Galdamez is a 80 y.o. y/o male with history of DVT who presents today for anticoagulation monitoring and adjustment. Pertinent PMH: Has been on warfarin since 2015. Hx patient of [facility] clinic  Patient Reported Findings:  Yes     No  [x]   []       Patient verifies current dosing regimen as listed- warfarin 5 mg Sun & Tues and 10 mg all other days---> confirms dose   []   [x]       S/S bleeding/bruising/swelling/SOB- denies   []   [x]       Blood in urine or stool-denies   []   [x]       Procedures scheduled in the future at this time   []   [x]       Missed Dose-   []   [x]       Extra Dose-   []   [x]       Change in medications- no changes    []   [x]       Change in health/diet/appetite- has greens twice weekly- broccoli and spinach and peas and sometimes iceburg lettuce, really likes dark green leafy vegetables- explained need for consistency---> continue with greens --> patient to increase to 2 days of greens per week--> eating greens 3 times a week and plans to continue, no NVD---> had tomatoes and asparagus once and broccoli once in the last week --> no changes  []   [x]       Change in alcohol use- has a glass of wine occasionally --> has a beer once a month --> no alcohol    []   [x]       Change in activity  []   [x]       Hospital admission  []   [x]       Emergency department visit  []   [x]       Other complaints    Clinical Outcomes:  Yes     No  []   [x]       Major bleeding event  []   [x]       Thromboembolic event    Duration of warfarin Therapy: indefinite   INR Range:  2.0-3.0     Has 10mg tablets of warfarin and uses a pillbox and takes in the morning. INR today is 2.6 again  Continue weekly dose of 5mg on Sun and Tues and 10mg all other days. Encouraged to maintain a consistency of vegetables/salads.   [facility]in [facility]   Recheck INR in 4 weeks, 11/16      Referring is Dr. Renny Ryan   INR (no units)   Date Value   09/21/2021 2.6   09/07/2021 3.6 08/09/2021 2.7   07/12/2021 2.3   05/29/2020 2.90   04/28/2020 2.30   03/31/2020 2.20       For Pharmacy Admin Tracking Only     Intervention Detail: Refill(s) Provided   Total # of Interventions Recommended: 1   Total # of Interventions Accepted: 1   Time Spent (min): 15

## 2021-11-15 ENCOUNTER — ANTI-COAG VISIT (OUTPATIENT)
Dept: PHARMACY | Age: 71
End: 2021-11-15
Payer: COMMERCIAL

## 2021-11-15 DIAGNOSIS — I26.99 PE (PULMONARY THROMBOEMBOLISM) (HCC): ICD-10-CM

## 2021-11-15 DIAGNOSIS — I82.409 DEEP VEIN THROMBOSIS (DVT) OF LOWER EXTREMITY, UNSPECIFIED CHRONICITY, UNSPECIFIED LATERALITY, UNSPECIFIED VEIN (HCC): Primary | ICD-10-CM

## 2021-11-15 LAB — INTERNATIONAL NORMALIZATION RATIO, POC: 2.9

## 2021-11-15 PROCEDURE — 85610 PROTHROMBIN TIME: CPT

## 2021-11-15 PROCEDURE — 99211 OFF/OP EST MAY X REQ PHY/QHP: CPT

## 2021-11-15 NOTE — PROGRESS NOTES
Mr. Marycruz Batista is a 80 y.o. y/o male with history of DVT who presents today for anticoagulation monitoring and adjustment. Pertinent PMH: Has been on warfarin since 2015. Hx patient of [facility] clinic  Patient Reported Findings:  Yes     No  [x]   []       Patient verifies current dosing regimen as listed- warfarin 5 mg Sun & Tues and 10 mg all other days---> confirms dose   []   [x]       S/S bleeding/bruising/swelling/SOB- denies   []   [x]       Blood in urine or stool-denies   []   [x]       Procedures scheduled in the future at this time    []   [x]       Missed Dose- denies   []   [x]       Extra Dose-   []   [x]       Change in medications- no changes    []   [x]       Change in health/diet/appetite- has greens twice weekly- broccoli and spinach and peas and sometimes iceburg lettuce, really likes dark green leafy vegetables- explained need for consistency---> continue with greens --> patient to increase to 2 days of greens per week--> eating greens 3 times a week and plans to continue, no NVD---> had tomatoes and asparagus once and broccoli once in the last week --> no changes  []   [x]       Change in alcohol use- has a glass of wine occasionally --> has a beer once a month --> no alcohol    []   [x]       Change in activity  []   [x]       Hospital admission  []   [x]       Emergency department visit  []   [x]       Other complaints    Clinical Outcomes:  Yes     No  []   [x]       Major bleeding event  []   [x]       Thromboembolic event    Duration of warfarin Therapy: indefinite   INR Range:  2.0-3.0     Has 10mg tablets of warfarin and uses a pillbox and takes in the morning. INR today is 2.9  Continue weekly dose of 5mg on Sun and Tues and 10mg all other days. Encouraged to maintain a consistency of vegetables/salads.   Recheck INR in 4 weeks, 12/13      Patient consent signed 11/15/21    Referring is Dr. Porter Smith   INR (no units)   Date Value   10/19/2021 2.6   09/21/2021 2.6 09/07/2021 3.6   08/09/2021 2.7   05/29/2020 2.90   04/28/2020 2.30   03/31/2020 2.20       For Pharmacy Admin Tracking Only     Total # of Interventions Recommended: 0   Total # of Interventions Accepted: 0   Time Spent (min): 15

## 2021-12-20 ENCOUNTER — TELEPHONE (OUTPATIENT)
Dept: PHARMACY | Age: 71
End: 2021-12-20

## 2021-12-21 ENCOUNTER — ANTI-COAG VISIT (OUTPATIENT)
Dept: PHARMACY | Age: 71
End: 2021-12-21
Payer: COMMERCIAL

## 2021-12-21 DIAGNOSIS — I82.409 DEEP VEIN THROMBOSIS (DVT) OF LOWER EXTREMITY, UNSPECIFIED CHRONICITY, UNSPECIFIED LATERALITY, UNSPECIFIED VEIN (HCC): Primary | ICD-10-CM

## 2021-12-21 DIAGNOSIS — I26.99 PE (PULMONARY THROMBOEMBOLISM) (HCC): ICD-10-CM

## 2021-12-21 LAB — INTERNATIONAL NORMALIZATION RATIO, POC: 2.4

## 2021-12-21 PROCEDURE — 85610 PROTHROMBIN TIME: CPT

## 2021-12-21 PROCEDURE — 99211 OFF/OP EST MAY X REQ PHY/QHP: CPT

## 2021-12-21 NOTE — PROGRESS NOTES
Mr. Juan Carlos Bill is a 80 y.o. y/o male with history of DVT who presents today for anticoagulation monitoring and adjustment. Pertinent PMH: Has been on warfarin since 2015. Hx patient of [facility]  Patient Reported Findings:  Yes     No  [x]   []       Patient verifies current dosing regimen as listed- warfarin 5 mg Sun & Tues and 10 mg all other days---> confirms dose   []   [x]       S/S bleeding/bruising/swelling/SOB- denies   []   [x]       Blood in urine or stool-denies   []   [x]       Procedures scheduled in the future at this time    []   [x]       Missed Dose- denies   []   [x]       Extra Dose-   []   [x]       Change in medications- no changes    []   [x]       Change in health/diet/appetite- has greens twice weekly- broccoli and spinach and peas and sometimes iceburg lettuce, really likes dark green leafy vegetables- explained need for consistency---> continue with greens --> patient to increase to 2 days of greens per week--> eating greens 3 times a week and plans to continue, no NVD---> had tomatoes and asparagus once and broccoli once in the last week --> no changes  []   [x]       Change in alcohol use- has a glass of wine occasionally --> has a beer once a month --> no alcohol    []   [x]       Change in activity  []   [x]       Hospital admission  []   [x]       Emergency department visit  []   [x]       Other complaints    Clinical Outcomes:  Yes     No  []   [x]       Major bleeding event  []   [x]       Thromboembolic event    Duration of warfarin Therapy: indefinite   INR Range:  2.0-3.0     Has 10mg tablets of warfarin and uses a pillbox and takes in the morning. INR today is 2.4  Continue weekly dose of 5mg on Sun and Tues and 10mg all other days. Encouraged to maintain a consistency of vegetables/salads.   Recheck INR in 4 weeks, 1/18      Patient consent signed 11/15/21    Referring is Dr. Britni Black   INR (no units)   Date Value   12/21/2021 2.4   11/15/2021 2.9 10/19/2021 2.6   09/21/2021 2.6   05/29/2020 2.90   04/28/2020 2.30   03/31/2020 2.20       For Pharmacy Admin Tracking Only     Total # of Interventions Recommended: 0   Total # of Interventions Accepted: 0   Time Spent (min): 15

## 2022-01-21 ENCOUNTER — TELEPHONE (OUTPATIENT)
Dept: PHARMACY | Age: 72
End: 2022-01-21

## 2022-01-24 ENCOUNTER — ANTI-COAG VISIT (OUTPATIENT)
Dept: PHARMACY | Age: 72
End: 2022-01-24
Payer: COMMERCIAL

## 2022-01-24 DIAGNOSIS — I82.409 DEEP VEIN THROMBOSIS (DVT) OF LOWER EXTREMITY, UNSPECIFIED CHRONICITY, UNSPECIFIED LATERALITY, UNSPECIFIED VEIN (HCC): Primary | ICD-10-CM

## 2022-01-24 DIAGNOSIS — I26.99 PE (PULMONARY THROMBOEMBOLISM) (HCC): ICD-10-CM

## 2022-01-24 LAB — INTERNATIONAL NORMALIZATION RATIO, POC: 3.5

## 2022-01-24 PROCEDURE — 85610 PROTHROMBIN TIME: CPT

## 2022-01-24 PROCEDURE — 99211 OFF/OP EST MAY X REQ PHY/QHP: CPT

## 2022-01-24 NOTE — PROGRESS NOTES
Mr. Ivana Fabry is a 80 y.o. y/o male with history of DVT who presents today for anticoagulation monitoring and adjustment. Pertinent PMH: Has been on warfarin since 2015. Hx patient of [facility] clinic  Patient Reported Findings:  Yes     No  [x]   []       Patient verifies current dosing regimen as listed- warfarin 5 mg Sun & Tues and 10 mg all other days---> confirms dose   []   [x]       S/S bleeding/bruising/swelling/SOB- denies   []   [x]       Blood in urine or stool-denies   []   [x]       Procedures scheduled in the future at this time    []   [x]       Missed Dose - denies   []   [x]       Extra Dose - denies   []   [x]       Change in medications- no changes    []   [x]       Change in health/diet/appetite- has greens twice weekly- broccoli and spinach and peas and sometimes iceburg lettuce, really likes dark green leafy vegetables- explained need for consistency---> continue with greens --> patient to increase to 2 days of greens per week--> eating greens 3 times a week and plans to continue, no NVD---> had tomatoes and asparagus once and broccoli once in the last week --> no changes  []   [x]       Change in alcohol use- has a glass of wine occasionally --> has a beer once a month --> no alcohol    []   [x]       Change in activity  []   [x]       Hospital admission  []   [x]       Emergency department visit  []   [x]       Other complaints    Clinical Outcomes:  Yes     No  []   [x]       Major bleeding event  []   [x]       Thromboembolic event    Duration of warfarin Therapy: indefinite   INR Range:  2.0-3.0     Has 10mg tablets of warfarin and uses a pillbox and takes in the morning. INR today is 3.5 d/t beer over the weekend while watching football. Patient historically stable on this dosing. Hold tomorrow, then continue weekly dose of 5mg on Sun and Tues and 10mg all other days. Encouraged to maintain a consistency of vegetables/salads. Refill called in to [facility].   Recheck INR in 4 weeks, 2/21    Patient consent signed 11/15/21    Referring is Dr. Tremaine Villalobos   INR (no units)   Date Value   01/24/2022 3.5   12/21/2021 2.4   11/15/2021 2.9   10/19/2021 2.6   05/29/2020 2.90   04/28/2020 2.30   03/31/2020 2.20     For Pharmacy Admin Tracking Only    Intervention Detail: Dose Adjustment: 1, reason: Therapy Optimization and Refill(s) Provided  Total # of Interventions Recommended: 1  Total # of Interventions Accepted: 1  Time Spent (min): 15

## 2022-01-24 NOTE — TELEPHONE ENCOUNTER
Warfarin prescription phoned into [facility] [address] to [address] under Dr. Parish Mott  Warfarin 10 mg tabs  Take 5mg on Sun and Tues and 10mg all other days  90 days   2 refills    Soo ZhengD

## 2022-02-28 ENCOUNTER — ANTI-COAG VISIT (OUTPATIENT)
Dept: PHARMACY | Age: 72
End: 2022-02-28
Payer: COMMERCIAL

## 2022-02-28 ENCOUNTER — TELEPHONE (OUTPATIENT)
Dept: PHARMACY | Age: 72
End: 2022-02-28

## 2022-02-28 DIAGNOSIS — I82.409 DEEP VEIN THROMBOSIS (DVT) OF LOWER EXTREMITY, UNSPECIFIED CHRONICITY, UNSPECIFIED LATERALITY, UNSPECIFIED VEIN (HCC): Primary | ICD-10-CM

## 2022-02-28 DIAGNOSIS — I26.99 PE (PULMONARY THROMBOEMBOLISM) (HCC): ICD-10-CM

## 2022-02-28 LAB — INTERNATIONAL NORMALIZATION RATIO, POC: 3

## 2022-02-28 PROCEDURE — 85610 PROTHROMBIN TIME: CPT

## 2022-02-28 PROCEDURE — 99211 OFF/OP EST MAY X REQ PHY/QHP: CPT

## 2022-02-28 NOTE — PROGRESS NOTES
Mr. Perlita Tucker is a 80 y.o. y/o male with history of DVT who presents today for anticoagulation monitoring and adjustment. Pertinent PMH: Has been on warfarin since . Hx patient of [facility] [place]  Patient Reported Findings:  Yes     No  [x]   []       Patient verifies current dosing regimen as listed- warfarin 5 mg Sun & Tues and 10 mg all other days---> confirms dose   []   [x]       S/S bleeding/bruising/swelling/SOB- denies   []   [x]       Blood in urine or stool-denies   []   [x]       Procedures scheduled in the future at this time    []   [x]       Missed Dose - denies   []   [x]       Extra Dose - denies   []   [x]       Change in medications- no changes    []   [x]       Change in health/diet/appetite- has greens twice weekly- broccoli and spinach and peas and sometimes iceburg lettuce, really likes dark green leafy vegetables- explained need for consistency---> continue with greens --> patient to increase to 2 days of greens per week--> eating greens 3 times a week and plans to continue, no NVD---> had tomatoes and asparagus once and broccoli once in the last week --> no changes  []   [x]       Change in alcohol use- has a glass of wine occasionally --> has a beer once a month --> no alcohol    []   [x]       Change in activity  []   [x]       Hospital admission  []   [x]       Emergency department visit  []   [x]       Other complaints    Clinical Outcomes:  Yes     No  []   [x]       Major bleeding event  []   [x]       Thromboembolic event    Duration of warfarin Therapy: indefinite   INR Range:  2.0-3.0     Has 10mg tablets of warfarin and uses a pillbox and takes in the morning. INR today is 3  Continue weekly dose of 5mg on Sun and Tues and 10mg all other days. Encouraged to maintain a consistency of vegetables/salads.    Recheck INR in 4 weeks, 3/28    Patient consent signed 11/15/21    Referring is Dr. Navi Patricio   INR (no units)   Date Value   2022 3.5   2021 2.4 11/15/2021 2.9   10/19/2021 2.6   05/29/2020 2.90   04/28/2020 2.30   03/31/2020 2.20     For Pharmacy Admin Tracking Only    Total # of Interventions Recommended: 0  Total # of Interventions Accepted: 0  Time Spent (min): 15

## 2022-03-29 ENCOUNTER — ANTI-COAG VISIT (OUTPATIENT)
Dept: PHARMACY | Age: 72
End: 2022-03-29
Payer: COMMERCIAL

## 2022-03-29 DIAGNOSIS — I26.99 PE (PULMONARY THROMBOEMBOLISM) (HCC): ICD-10-CM

## 2022-03-29 DIAGNOSIS — I82.409 DEEP VEIN THROMBOSIS (DVT) OF LOWER EXTREMITY, UNSPECIFIED CHRONICITY, UNSPECIFIED LATERALITY, UNSPECIFIED VEIN (HCC): Primary | ICD-10-CM

## 2022-03-29 LAB — INTERNATIONAL NORMALIZATION RATIO, POC: 3.5

## 2022-03-29 PROCEDURE — 85610 PROTHROMBIN TIME: CPT

## 2022-03-29 PROCEDURE — 99211 OFF/OP EST MAY X REQ PHY/QHP: CPT

## 2022-03-29 NOTE — PROGRESS NOTES
Mr. Abigail Anderson is a 80 y.o. y/o male with history of DVT who presents today for anticoagulation monitoring and adjustment. Pertinent PMH: Has been on warfarin since 2015. Hx patient of [facility] clinic  Patient Reported Findings:  Yes     No  [x]   []       Patient verifies current dosing regimen as listed- warfarin 5 mg Sun & Tues and 10 mg all other days---> confirms dose   []   [x]       S/S bleeding/bruising/swelling/SOB- denies   []   [x]       Blood in urine or stool-denies   []   [x]       Procedures scheduled in the future at this time    []   [x]       Missed Dose - denies   []   [x]       Extra Dose - denies   []   [x]       Change in medications- no changes    []   [x]       Change in health/diet/appetite- has greens twice weekly- broccoli and spinach and peas and sometimes iceburg lettuce, really likes dark green leafy vegetables- explained need for consistency---> continue with greens --> patient to increase to 2 days of greens per week--> eating greens 3 times a week and plans to continue, no NVD---> had tomatoes and asparagus once and broccoli once in the last week --> no changes--->had less greens, no NVD  []   [x]       Change in alcohol use- has a glass of wine occasionally --> has a beer once a month --> no alcohol    []   [x]       Change in activity  []   [x]       Hospital admission  []   [x]       Emergency department visit  []   [x]       Other complaints    Clinical Outcomes:  Yes     No  []   [x]       Major bleeding event  []   [x]       Thromboembolic event    Duration of warfarin Therapy: indefinite   INR Range:  2.0-3.0     Has 10mg tablets of warfarin and uses a pillbox and takes in the morning. INR today is 3.5  Hold tomorrow then continue weekly dose of 5mg on Sun and Tues and 10mg all other days. Encouraged to maintain a consistency of vegetables/salads.    Recheck INR in 4 weeks, 4/26    Patient consent signed 11/15/21    Referring is Dr. Dean Melo   INR (no units)   Date Value   03/29/2022 3.5   02/28/2022 3   01/24/2022 3.5   12/21/2021 2.4   05/29/2020 2.90   04/28/2020 2.30   03/31/2020 2.20   For Pharmacy Admin Tracking Only     Intervention Detail: Dose Adjustment: 1, reason: Therapy Optimization   Total # of Interventions Recommended: 1   Total # of Interventions Accepted: 1   Time Spent (min): 15

## 2022-04-26 ENCOUNTER — ANTI-COAG VISIT (OUTPATIENT)
Dept: PHARMACY | Age: 72
End: 2022-04-26
Payer: COMMERCIAL

## 2022-04-26 DIAGNOSIS — I82.409 DEEP VEIN THROMBOSIS (DVT) OF LOWER EXTREMITY, UNSPECIFIED CHRONICITY, UNSPECIFIED LATERALITY, UNSPECIFIED VEIN (HCC): Primary | ICD-10-CM

## 2022-04-26 DIAGNOSIS — I26.99 PE (PULMONARY THROMBOEMBOLISM) (HCC): ICD-10-CM

## 2022-04-26 LAB — INTERNATIONAL NORMALIZATION RATIO, POC: 2.2

## 2022-04-26 PROCEDURE — 85610 PROTHROMBIN TIME: CPT

## 2022-04-26 PROCEDURE — 99211 OFF/OP EST MAY X REQ PHY/QHP: CPT

## 2022-04-26 NOTE — PROGRESS NOTES
Mr. Rakel Boss is a 80 y.o. y/o male with history of DVT who presents today for anticoagulation monitoring and adjustment. Pertinent PMH: Has been on warfarin since 2015. Hx patient of [facility] clinic  Patient Reported Findings:  Yes     No  [x]   []       Patient verifies current dosing regimen as listed- warfarin 5 mg Sun & Tues and 10 mg all other days---> confirms dose   []   [x]       S/S bleeding/bruising/swelling/SOB- denies   []   [x]       Blood in urine or stool-denies   []   [x]       Procedures scheduled in the future at this time    []   [x]       Missed Dose - denies   []   [x]       Extra Dose - denies   []   [x]       Change in medications- no changes    []   [x]       Change in health/diet/appetite- has greens twice weekly- broccoli and spinach and peas and sometimes iceburg lettuce, really likes dark green leafy vegetables- explained need for consistency---> continue with greens --> patient to increase to 2 days of greens per week--> eating greens 3 times a week and plans to continue, no NVD---> had tomatoes and asparagus once and broccoli once in the last week --> no changes  []   [x]       Change in alcohol use- has a glass of wine occasionally --> has a beer once a month --> no alcohol    []   [x]       Change in activity  []   [x]       Hospital admission  []   [x]       Emergency department visit  []   [x]       Other complaints    Clinical Outcomes:  Yes     No  []   [x]       Major bleeding event  []   [x]       Thromboembolic event    Duration of warfarin Therapy: indefinite   INR Range:  2.0-3.0     Has 10mg tablets of warfarin and uses a pillbox and takes in the morning. INR today is 2.2   Continue weekly dose of 5mg on Sun and Tues and 10mg all other days. Encouraged to maintain a consistency of vegetables/salads.    Refilled warfarin at op pharmacy   Recheck INR in 4 weeks, 5/24    Patient consent signed 11/15/21    Referring is Dr. Georgina Nash   INR (no units)   Date Value   03/29/2022 3.5   02/28/2022 3   01/24/2022 3.5   12/21/2021 2.4   05/29/2020 2.90   04/28/2020 2.30   03/31/2020 2.20     For Pharmacy Admin Tracking Only     Intervention Detail: Refill(s) Provided   Total # of Interventions Recommended: 1   Total # of Interventions Accepted: 1   Time Spent (min): 15

## 2022-05-24 ENCOUNTER — ANTI-COAG VISIT (OUTPATIENT)
Dept: PHARMACY | Age: 72
End: 2022-05-24
Payer: COMMERCIAL

## 2022-05-24 DIAGNOSIS — I82.409 DEEP VEIN THROMBOSIS (DVT) OF LOWER EXTREMITY, UNSPECIFIED CHRONICITY, UNSPECIFIED LATERALITY, UNSPECIFIED VEIN (HCC): Primary | ICD-10-CM

## 2022-05-24 DIAGNOSIS — I26.99 PE (PULMONARY THROMBOEMBOLISM) (HCC): ICD-10-CM

## 2022-05-24 LAB — INTERNATIONAL NORMALIZATION RATIO, POC: 3

## 2022-05-24 PROCEDURE — 85610 PROTHROMBIN TIME: CPT

## 2022-05-24 PROCEDURE — 99211 OFF/OP EST MAY X REQ PHY/QHP: CPT

## 2022-05-24 NOTE — PROGRESS NOTES
Mr. Orion Horta is a 80 y.o. y/o male with history of DVT who presents today for anticoagulation monitoring and adjustment. Pertinent PMH: Has been on warfarin since 2015. Hx patient of [facility] clinic  Patient Reported Findings:  Yes     No  [x]   []       Patient verifies current dosing regimen as listed- warfarin 5 mg Sun & Tues and 10 mg all other days---> confirms dose   []   [x]       S/S bleeding/bruising/swelling/SOB- denies   []   [x]       Blood in urine or stool-denies   []   [x]       Procedures scheduled in the future at this time    []   [x]       Missed Dose - denies   []   [x]       Extra Dose - denies   []   [x]       Change in medications- no changes    []   [x]       Change in health/diet/appetite- has greens twice weekly- broccoli and spinach and peas and sometimes iceburg lettuce, really likes dark green leafy vegetables- explained need for consistency---> continue with greens --> patient to increase to 2 days of greens per week--> eating greens 3 times a week and plans to continue, no NVD---> had tomatoes and asparagus once and broccoli once in the last week --> no changes  []   [x]       Change in alcohol use- has a glass of wine occasionally --> has a beer once a month --> no alcohol    []   [x]       Change in activity  []   [x]       Hospital admission  []   [x]       Emergency department visit  []   [x]       Other complaints    Clinical Outcomes:  Yes     No  []   [x]       Major bleeding event  []   [x]       Thromboembolic event    Duration of warfarin Therapy: indefinite   INR Range:  2.0-3.0     Has 10mg tablets of warfarin and uses a pillbox and takes in the morning. INR today is 3.0  Continue weekly dose of 5mg on Sun and Tues and 10mg all other days. Encouraged to maintain a consistency of vegetables/salads.    Recheck INR in 4 weeks, 6/21    Patient consent signed 11/15/21    Referring is Dr. Paul Dwyer   INR (no units)   Date Value   05/24/2022 3.0   04/26/2022 2.2   03/29/2022 3.5   02/28/2022 3   05/29/2020 2.90   04/28/2020 2.30   03/31/2020 2.20     For Pharmacy Admin Tracking Only     Total # of Interventions Recommended: 0   Total # of Interventions Accepted: 0   Time Spent (min): 15

## 2022-06-21 ENCOUNTER — ANTI-COAG VISIT (OUTPATIENT)
Dept: PHARMACY | Age: 72
End: 2022-06-21
Payer: COMMERCIAL

## 2022-06-21 DIAGNOSIS — I26.99 PE (PULMONARY THROMBOEMBOLISM) (HCC): ICD-10-CM

## 2022-06-21 DIAGNOSIS — I82.409 DEEP VEIN THROMBOSIS (DVT) OF LOWER EXTREMITY, UNSPECIFIED CHRONICITY, UNSPECIFIED LATERALITY, UNSPECIFIED VEIN (HCC): Primary | ICD-10-CM

## 2022-06-21 LAB — INTERNATIONAL NORMALIZATION RATIO, POC: 2.9

## 2022-06-21 PROCEDURE — 85610 PROTHROMBIN TIME: CPT

## 2022-06-21 PROCEDURE — 99211 OFF/OP EST MAY X REQ PHY/QHP: CPT

## 2022-06-21 NOTE — PROGRESS NOTES
Mr. Lissette Sherwood is a 80 y.o. y/o male with history of DVT who presents today for anticoagulation monitoring and adjustment. Pertinent PMH: Has been on warfarin since 2015. Hx patient of [facility]  Patient Reported Findings:  Yes     No  [x]   []       Patient verifies current dosing regimen as listed- warfarin 5 mg Sun & Tues and 10 mg all other days---> confirms dose   []   [x]       S/S bleeding/bruising/swelling/SOB- denies   []   [x]       Blood in urine or stool-denies   []   [x]       Procedures scheduled in the future at this time  Denies   []   [x]       Missed Dose - denies   []   [x]       Extra Dose - denies   []   [x]       Change in medications- no changes    []   [x]       Change in health/diet/appetite- has greens twice weekly- broccoli and spinach and peas and sometimes iceburg lettuce, really likes dark green leafy vegetables- explained need for consistency---> continue with greens --> patient to increase to 2 days of greens per week--> eating greens 3 times a week and plans to continue, no NVD---> had tomatoes and asparagus once and broccoli once in the last week --> no changes  []   [x]       Change in alcohol use- has a glass of wine occasionally --> has a beer once a month --> no alcohol    []   [x]       Change in activity  []   [x]       Hospital admission  []   [x]       Emergency department visit  []   [x]       Other complaints    Clinical Outcomes:  Yes     No  []   [x]       Major bleeding event  []   [x]       Thromboembolic event    Duration of warfarin Therapy: indefinite   INR Range:  2.0-3.0     Has 10mg tablets of warfarin and uses a pillbox and takes in the morning. INR today is 2.9  Continue weekly dose of 5mg on Sun and Tues and 10mg all other days. Encouraged to maintain a consistency of vegetables/salads.    Recheck INR in 4 weeks, 7/19    Patient consent signed 11/15/21    Referring is Dr. Madison Davis   INR (no units)   Date Value   05/29/2020 2.90 04/28/2020 2.30   03/31/2020 2.20     INR,(POC) (no units)   Date Value   05/24/2022 3.0   04/26/2022 2.2   03/29/2022 3.5   02/28/2022 3     For Pharmacy Admin Tracking Only     Total # of Interventions Recommended: 0   Total # of Interventions Accepted: 0   Time Spent (min): 15

## 2022-07-19 ENCOUNTER — ANTI-COAG VISIT (OUTPATIENT)
Dept: PHARMACY | Age: 72
End: 2022-07-19
Payer: COMMERCIAL

## 2022-07-19 DIAGNOSIS — I26.99 PE (PULMONARY THROMBOEMBOLISM) (HCC): ICD-10-CM

## 2022-07-19 DIAGNOSIS — I82.409 DEEP VEIN THROMBOSIS (DVT) OF LOWER EXTREMITY, UNSPECIFIED CHRONICITY, UNSPECIFIED LATERALITY, UNSPECIFIED VEIN (HCC): Primary | ICD-10-CM

## 2022-07-19 LAB — INTERNATIONAL NORMALIZATION RATIO, POC: 3.2

## 2022-07-19 PROCEDURE — 99211 OFF/OP EST MAY X REQ PHY/QHP: CPT

## 2022-07-19 PROCEDURE — 85610 PROTHROMBIN TIME: CPT

## 2022-07-19 NOTE — PROGRESS NOTES
Mr. Jenny Broussard is a 80 y.o. y/o male with history of DVT who presents today for anticoagulation monitoring and adjustment. Pertinent PMH: Has been on warfarin since 2015. Hx patient of [facility] clinic  Patient Reported Findings:  Yes     No  [x]   []       Patient verifies current dosing regimen as listed- warfarin 5 mg Sun & Tues and 10 mg all other days---> confirms dose   []   [x]       S/S bleeding/bruising/swelling/SOB- denies   []   [x]       Blood in urine or stool-denies   []   [x]       Procedures scheduled in the future at this time  Denies   []   [x]       Missed Dose - denies   []   [x]       Extra Dose - denies   []   [x]       Change in medications- no changes    []   [x]       Change in health/diet/appetite- has greens twice weekly- broccoli and spinach and peas and sometimes iceburg lettuce, really likes dark green leafy vegetables- explained need for consistency---> continue with greens --> patient to increase to 2 days of greens per week--> eating greens 3 times a week and plans to continue, no NVD---> had tomatoes and asparagus once and broccoli once in the last week --> no changes---> less greens, no NVD  []   [x]       Change in alcohol use- has a glass of wine occasionally --> has a beer once a month --> no alcohol---> nothing for a few days     []   [x]       Change in activity  []   [x]       Hospital admission  []   [x]       Emergency department visit  []   [x]       Other complaints    Clinical Outcomes:  Yes     No  []   [x]       Major bleeding event  []   [x]       Thromboembolic event    Duration of warfarin Therapy: indefinite   INR Range:  2.0-3.0     Has 10mg tablets of warfarin and uses a pillbox and takes in the morning. INR today is 3.2 dt less greens   Continue weekly dose of 5mg on Sun and Tues and 10mg all other days. Encouraged to maintain a consistency of vegetables/salads. RF called into [facility].   Recheck INR in 4 weeks, 8/16    Patient consent signed 11/15/21    Referring is Dr. Praneeth Tovar   INR (no units)   Date Value   05/29/2020 2.90   04/28/2020 2.30   03/31/2020 2.20     INR,(POC) (no units)   Date Value   07/19/2022 3.2   06/21/2022 2.9   05/24/2022 3.0   04/26/2022 2.2   For Pharmacy Admin Tracking Only    Intervention Detail: Refill(s) Provided  Total # of Interventions Recommended: 1  Total # of Interventions Accepted: 1  Time Spent (min): 15

## 2022-08-16 ENCOUNTER — ANTI-COAG VISIT (OUTPATIENT)
Dept: PHARMACY | Age: 72
End: 2022-08-16
Payer: COMMERCIAL

## 2022-08-16 DIAGNOSIS — I82.409 DEEP VEIN THROMBOSIS (DVT) OF LOWER EXTREMITY, UNSPECIFIED CHRONICITY, UNSPECIFIED LATERALITY, UNSPECIFIED VEIN (HCC): Primary | ICD-10-CM

## 2022-08-16 DIAGNOSIS — I26.99 PE (PULMONARY THROMBOEMBOLISM) (HCC): ICD-10-CM

## 2022-08-16 LAB — INTERNATIONAL NORMALIZATION RATIO, POC: 3.1

## 2022-08-16 PROCEDURE — 99211 OFF/OP EST MAY X REQ PHY/QHP: CPT

## 2022-08-16 PROCEDURE — 85610 PROTHROMBIN TIME: CPT

## 2022-08-16 NOTE — PROGRESS NOTES
Mr. Misael Monreal is a 80 y.o. y/o male with history of DVT who presents today for anticoagulation monitoring and adjustment. Pertinent PMH: Has been on warfarin since 2015. Hx patient of [facility] clinic  Patient Reported Findings:  Yes     No  [x]   []       Patient verifies current dosing regimen as listed- warfarin 5 mg Sun & Tues and 10 mg all other days---> confirms dose   []   [x]       S/S bleeding/bruising/swelling/SOB- denies   []   [x]       Blood in urine or stool-denies   []   [x]       Procedures scheduled in the future at this time  Denies   []   [x]       Missed Dose - denies   []   [x]       Extra Dose - denies   []   [x]       Change in medications- no changes    []   [x]       Change in health/diet/appetite- has greens twice weekly- broccoli and spinach and peas and sometimes iceburg lettuce, really likes dark green leafy vegetables- explained need for consistency---> continue with greens --> patient to increase to 2 days of greens per week--> eating greens 3 times a week and plans to continue, no NVD---> had tomatoes and asparagus once and broccoli once in the last week --> no changes---> less greens, no NVD---> a little greens but not back to normal yet, no NVD  []   [x]       Change in alcohol use- has a glass of wine occasionally --> has a beer once a month --> no alcohol---> nothing for a few days     []   [x]       Change in activity  []   [x]       Hospital admission  []   [x]       Emergency department visit  []   [x]       Other complaints    Clinical Outcomes:  Yes     No  []   [x]       Major bleeding event  []   [x]       Thromboembolic event    Duration of warfarin Therapy: indefinite   INR Range:  2.0-3.0     Has 10mg tablets of warfarin and uses a pillbox and takes in the morning. INR today is 3.1 dt less greens, working on adding more in. Continue weekly dose of 5mg on Sun and Tues and 10mg all other days.   Encouraged to maintain a consistency of vegetables/salads.    Recheck INR in 4 weeks, 9/13    Patient consent signed 11/15/21    Referring is Dr. Moore Colton   INR (no units)   Date Value   05/29/2020 2.90   04/28/2020 2.30   03/31/2020 2.20     INR,(POC) (no units)   Date Value   08/16/2022 3.1   07/19/2022 3.2   06/21/2022 2.9   05/24/2022 3.0   For Pharmacy Admin Tracking Only  Total # of Interventions Recommended: 0  Total # of Interventions Accepted: 0  Time Spent (min): 15

## 2022-09-14 ENCOUNTER — ANTI-COAG VISIT (OUTPATIENT)
Dept: PHARMACY | Age: 72
End: 2022-09-14
Payer: COMMERCIAL

## 2022-09-14 DIAGNOSIS — I82.409 DEEP VEIN THROMBOSIS (DVT) OF LOWER EXTREMITY, UNSPECIFIED CHRONICITY, UNSPECIFIED LATERALITY, UNSPECIFIED VEIN (HCC): Primary | ICD-10-CM

## 2022-09-14 DIAGNOSIS — I26.99 PE (PULMONARY THROMBOEMBOLISM) (HCC): ICD-10-CM

## 2022-09-14 LAB — INTERNATIONAL NORMALIZATION RATIO, POC: 1.7

## 2022-09-14 PROCEDURE — 99211 OFF/OP EST MAY X REQ PHY/QHP: CPT

## 2022-09-14 PROCEDURE — 85610 PROTHROMBIN TIME: CPT

## 2022-09-14 NOTE — PROGRESS NOTES
Mr. Elaine Conroy is a 80 y.o. y/o male with history of DVT who presents today for anticoagulation monitoring and adjustment. Pertinent PMH: Has been on warfarin since 2015. Hx patient of [facility] clinic  Patient Reported Findings:  Yes     No  [x]   []       Patient verifies current dosing regimen as listed- warfarin 5 mg Sun & Tues and 10 mg all other days---> confirms dose   []   [x]       S/S bleeding/bruising/swelling/SOB- denies   []   [x]       Blood in urine or stool-denies   []   [x]       Procedures scheduled in the future at this time  Denies   []   [x]       Missed Dose - denies   []   [x]       Extra Dose - denies   []   [x]       Change in medications- no changes    []   [x]       Change in health/diet/appetite- has greens twice weekly- broccoli and spinach and peas and sometimes iceburg lettuce, really likes dark green leafy vegetables- explained need for consistency---> continue with greens --> patient to increase to 2 days of greens per week--> eating greens 3 times a week and plans to continue, no NVD---> had tomatoes and asparagus once and broccoli once in the last week --> no changes---> less greens, no NVD---> a little greens but not back to normal yet, no NVD---> had more greens  []   [x]       Change in alcohol use- has a glass of wine occasionally --> has a beer once a month --> no alcohol---> nothing for a few days     []   [x]       Change in activity  []   [x]       Hospital admission  []   [x]       Emergency department visit  []   [x]       Other complaints    Clinical Outcomes:  Yes     No  []   [x]       Major bleeding event  []   [x]       Thromboembolic event    Duration of warfarin Therapy: indefinite   INR Range:  2.0-3.0     Has 10mg tablets of warfarin and uses a pillbox and takes in the morning. INR today is 1.7 dt more greens  Take 15mg tomorrow then continue weekly dose of 5mg on Sun and Tues and 10mg all other days.   Encouraged to maintain a consistency of vegetables/salads. Will need RF next time. Recheck INR in 4 weeks, 10/10   HBD yesterday!      Patient consent signed 11/15/21    Referring is Dr. Daphne Bautista   INR (no units)   Date Value   05/29/2020 2.90   04/28/2020 2.30   03/31/2020 2.20     INR,(POC) (no units)   Date Value   09/14/2022 1.7   08/16/2022 3.1   07/19/2022 3.2   06/21/2022 2.9   For Pharmacy Admin Tracking Only    Intervention Detail: Dose Adjustment: 1, reason: Therapy Optimization  Total # of Interventions Recommended: 1  Total # of Interventions Accepted: 1  Time Spent (min): 15

## 2022-10-10 ENCOUNTER — ANTI-COAG VISIT (OUTPATIENT)
Dept: PHARMACY | Age: 72
End: 2022-10-10
Payer: COMMERCIAL

## 2022-10-10 DIAGNOSIS — I26.99 PE (PULMONARY THROMBOEMBOLISM) (HCC): ICD-10-CM

## 2022-10-10 DIAGNOSIS — I82.409 DEEP VEIN THROMBOSIS (DVT) OF LOWER EXTREMITY, UNSPECIFIED CHRONICITY, UNSPECIFIED LATERALITY, UNSPECIFIED VEIN (HCC): Primary | ICD-10-CM

## 2022-10-10 LAB — INTERNATIONAL NORMALIZATION RATIO, POC: 2.3

## 2022-10-10 PROCEDURE — 99211 OFF/OP EST MAY X REQ PHY/QHP: CPT

## 2022-10-10 PROCEDURE — 85610 PROTHROMBIN TIME: CPT

## 2022-10-10 NOTE — PROGRESS NOTES
Mr. Micha Horta is a 71 y.o. y/o male with history of DVT who presents today for anticoagulation monitoring and adjustment. Pertinent PMH: Has been on warfarin since 2015. Hx patient of [facility] clinic  Patient Reported Findings:  Yes     No  [x]   []       Patient verifies current dosing regimen as listed- warfarin 5 mg Sun & Tues and 10 mg all other days---> confirms dose   []   [x]       S/S bleeding/bruising/swelling/SOB- denies   []   [x]       Blood in urine or stool-denies   []   [x]       Procedures scheduled in the future at this time  Denies   []   [x]       Missed Dose - denies   []   [x]       Extra Dose - denies   []   [x]       Change in medications- no changes    []   [x]       Change in health/diet/appetite- has greens twice weekly- broccoli and spinach and peas and sometimes iceburg lettuce, really likes dark green leafy vegetables- explained need for consistency---> continue with greens --> patient to increase to 2 days of greens per week--> eating greens 3 times a week and plans to continue, no NVD---> had tomatoes and asparagus once and broccoli once in the last week --> no changes---> less greens, no NVD---> a little greens but not back to normal yet, no NVD---> had more greens---> no changes   []   [x]       Change in alcohol use- has a glass of wine occasionally --> has a beer once a month --> no alcohol---> nothing for a few days     []   [x]       Change in activity  []   [x]       Hospital admission  []   [x]       Emergency department visit  []   [x]       Other complaints    Clinical Outcomes:  Yes     No  []   [x]       Major bleeding event  []   [x]       Thromboembolic event    Duration of warfarin Therapy: indefinite   INR Range:  2.0-3.0     Has 10mg tablets of warfarin and uses a pillbox and takes in the morning. INR today is 2.3  Continue weekly dose of 5mg on Sun and Tues and 10mg all other days. Encouraged to maintain a consistency of vegetables/salads. Recheck INR in 4 weeks, 11/7    Patient consent signed 11/15/21    Referring is Dr. Nadeen Hoskins   INR (no units)   Date Value   05/29/2020 2.90   04/28/2020 2.30   03/31/2020 2.20     INR,(POC) (no units)   Date Value   10/10/2022 2.3   09/14/2022 1.7   08/16/2022 3.1   07/19/2022 3.2   For Pharmacy Admin Tracking Only  Total # of Interventions Recommended: 0  Total # of Interventions Accepted: 0  Time Spent (min): 15

## 2022-11-07 ENCOUNTER — ANTI-COAG VISIT (OUTPATIENT)
Dept: PHARMACY | Age: 72
End: 2022-11-07
Payer: COMMERCIAL

## 2022-11-07 DIAGNOSIS — I26.99 PE (PULMONARY THROMBOEMBOLISM) (HCC): ICD-10-CM

## 2022-11-07 DIAGNOSIS — I82.409 DEEP VEIN THROMBOSIS (DVT) OF LOWER EXTREMITY, UNSPECIFIED CHRONICITY, UNSPECIFIED LATERALITY, UNSPECIFIED VEIN (HCC): Primary | ICD-10-CM

## 2022-11-07 LAB — INTERNATIONAL NORMALIZATION RATIO, POC: 2.1

## 2022-11-07 PROCEDURE — 99211 OFF/OP EST MAY X REQ PHY/QHP: CPT

## 2022-11-07 PROCEDURE — 85610 PROTHROMBIN TIME: CPT

## 2022-11-07 NOTE — TELEPHONE ENCOUNTER
Warfarin prescription phoned into [facility] [address] to [address] under [address]. Dmitry Greco  Warfarin 10 mg tabs  Take 5mg on Sun and Tues and 10mg all other days  90 days   2 refills

## 2022-11-07 NOTE — PROGRESS NOTES
Mr. Connor Vernon is a 71 y.o. y/o male with history of DVT who presents today for anticoagulation monitoring and adjustment. Pertinent PMH: Has been on warfarin since 2015. Hx patient of [facility] clinic  Patient Reported Findings:  Yes     No  [x]   []       Patient verifies current dosing regimen as listed- warfarin 5 mg Sun & Tues and 10 mg all other days---> confirms dose   []   [x]       S/S bleeding/bruising/swelling/SOB- denies   []   [x]       Blood in urine or stool-denies   []   [x]       Procedures scheduled in the future at this time  Denies   []   [x]       Missed Dose - denies   []   [x]       Extra Dose - denies   []   [x]       Change in medications- no changes    []   [x]       Change in health/diet/appetite- has greens twice weekly- broccoli and spinach and peas and sometimes iceburg lettuce, really likes dark green leafy vegetables- explained need for consistency---> continue with greens --> patient to increase to 2 days of greens per week--> eating greens 3 times a week and plans to continue, no NVD---> had tomatoes and asparagus once and broccoli once in the last week --> no changes  []   [x]       Change in alcohol use- has a glass of wine occasionally --> has a beer once a month --> no alcohol---> nothing for a few days     []   [x]       Change in activity  []   [x]       Hospital admission  []   [x]       Emergency department visit  []   [x]       Other complaints    Clinical Outcomes:  Yes     No  []   [x]       Major bleeding event  []   [x]       Thromboembolic event    Duration of warfarin Therapy: indefinite   INR Range:  2.0-3.0     Has 10mg tablets of warfarin and uses a pillbox and takes in the morning. INR today is 2.1  Continue weekly dose of 5mg on Sun and Tues and 10mg all other days. Encouraged to maintain a consistency of vegetables/salads.    Refilled warfarin at opp   Recheck INR in 4 weeks, 12/5    Patient consent signed 11/15/21    Referring is Dr. Akira Farmer INR (no units)   Date Value   05/29/2020 2.90   04/28/2020 2.30   03/31/2020 2.20     INR,(POC) (no units)   Date Value   10/10/2022 2.3   09/14/2022 1.7   08/16/2022 3.1   07/19/2022 3.2     For Pharmacy Admin Tracking Only    Intervention Detail: Refill(s) Provided  Total # of Interventions Recommended: 1  Total # of Interventions Accepted: 1  Time Spent (min): 15

## 2022-12-06 ENCOUNTER — TELEPHONE (OUTPATIENT)
Dept: PHARMACY | Age: 72
End: 2022-12-06

## 2022-12-08 ENCOUNTER — ANTI-COAG VISIT (OUTPATIENT)
Dept: PHARMACY | Age: 72
End: 2022-12-08
Payer: COMMERCIAL

## 2022-12-08 DIAGNOSIS — I26.99 PE (PULMONARY THROMBOEMBOLISM) (HCC): ICD-10-CM

## 2022-12-08 DIAGNOSIS — I82.409 DEEP VEIN THROMBOSIS (DVT) OF LOWER EXTREMITY, UNSPECIFIED CHRONICITY, UNSPECIFIED LATERALITY, UNSPECIFIED VEIN (HCC): Primary | ICD-10-CM

## 2022-12-08 LAB — INTERNATIONAL NORMALIZATION RATIO, POC: 2.5

## 2022-12-08 PROCEDURE — 85610 PROTHROMBIN TIME: CPT

## 2022-12-08 PROCEDURE — 99211 OFF/OP EST MAY X REQ PHY/QHP: CPT

## 2022-12-08 NOTE — PROGRESS NOTES
Mr. Teresa Courtney is a 71 y.o. y/o male with history of DVT who presents today for anticoagulation monitoring and adjustment. Pertinent PMH: Has been on warfarin since 2015. Hx patient of [facility] clinic  Patient Reported Findings:  Yes     No  [x]   []       Patient verifies current dosing regimen as listed- warfarin 5 mg Sun & Tues and 10 mg all other days---> confirms dose   []   [x]       S/S bleeding/bruising/swelling/SOB- denies   []   [x]       Blood in urine or stool-denies   []   [x]       Procedures scheduled in the future at this time  Denies   []   [x]       Missed Dose - denies   []   [x]       Extra Dose - denies   []   [x]       Change in medications- no changes    []   [x]       Change in health/diet/appetite- has greens twice weekly- broccoli and spinach and peas and sometimes iceburg lettuce, really likes dark green leafy vegetables- explained need for consistency---> continue with greens --> patient to increase to 2 days of greens per week--> eating greens 3 times a week and plans to continue, no NVD---> had tomatoes and asparagus once and broccoli once in the last week --> no changes  []   [x]       Change in alcohol use- has a glass of wine occasionally --> has a beer once a month --> no alcohol---> nothing for a few days     []   [x]       Change in activity  []   [x]       Hospital admission  []   [x]       Emergency department visit  []   [x]       Other complaints    Clinical Outcomes:  Yes     No  []   [x]       Major bleeding event  []   [x]       Thromboembolic event    Duration of warfarin Therapy: indefinite   INR Range:  2.0-3.0     Has 10mg tablets of warfarin and uses a pillbox and takes in the morning. INR today is 2.5  Continue weekly dose of 5mg on Sun and Tues and 10mg all other days. Encouraged to maintain a consistency of vegetables/salads.   Recheck INR in 4 weeks, 1/5/23    Patient consent signed 11/15/21    Referring is Dr. Marianna Lynn   INR (no units)   Date Value   05/29/2020 2.90   04/28/2020 2.30   03/31/2020 2.20     INR,(POC) (no units)   Date Value   11/07/2022 2.1   10/10/2022 2.3   09/14/2022 1.7   08/16/2022 3.1     For Pharmacy Admin Tracking Only    Total # of Interventions Recommended: 0  Total # of Interventions Accepted: 0  Time Spent (min): 15

## 2022-12-08 NOTE — TELEPHONE ENCOUNTER
Called and spoke with patient who rescheduled for 12/8.
Patient seen in clinic today.
Normal rate, regular rhythm.  Heart sounds S1, S2.  No murmurs, rubs or gallops.

## 2023-01-10 ENCOUNTER — TELEPHONE (OUTPATIENT)
Dept: PHARMACY | Age: 73
End: 2023-01-10

## 2023-01-10 ENCOUNTER — ANTI-COAG VISIT (OUTPATIENT)
Dept: PHARMACY | Age: 73
End: 2023-01-10
Payer: COMMERCIAL

## 2023-01-10 DIAGNOSIS — I82.409 DEEP VEIN THROMBOSIS (DVT) OF LOWER EXTREMITY, UNSPECIFIED CHRONICITY, UNSPECIFIED LATERALITY, UNSPECIFIED VEIN (HCC): Primary | ICD-10-CM

## 2023-01-10 DIAGNOSIS — I26.99 PE (PULMONARY THROMBOEMBOLISM) (HCC): ICD-10-CM

## 2023-01-10 LAB — INTERNATIONAL NORMALIZATION RATIO, POC: 2.6

## 2023-01-10 PROCEDURE — 85610 PROTHROMBIN TIME: CPT

## 2023-01-10 PROCEDURE — 99212 OFFICE O/P EST SF 10 MIN: CPT

## 2023-01-10 NOTE — PROGRESS NOTES
Pt called back. States that procedure is on 2/10. Was instructed to hold warfarin 5 days prior to procedure. Was not given instructions when to begin and stop lovenox. Advised pt to begin lovenox injections 2/6 in PM. Continue every 12 hours until 2/9 AM then stop lovenox until after surgery. Resume warfarin AND lovenox 2/10 after procedure (unless otherwise instructed by surgeon). Take warfarin 15 mg for 2 days then return to normal weekly dose of warfarin.  Continue lovenox injections every 12 hours until RTC

## 2023-01-10 NOTE — PROGRESS NOTES
Mr. Velinda Paget is a 71 y.o. y/o male with history of DVT who presents today for anticoagulation monitoring and adjustment. Pertinent PMH: Has been on warfarin since 2015. Hx patient of [facility] clinic  Patient Reported Findings:  Yes     No  [x]   []       Patient verifies current dosing regimen as listed- warfarin 5 mg Sun & Tues and 10 mg all other days---> confirms dose   []   [x]       S/S bleeding/bruising/swelling/SOB- denies   []   [x]       Blood in urine or stool-denies   [x]   []       Procedures scheduled in the future at this time  is having vascular surgery on 2/10, was told to hold warfarin 5 days prior and bridge with lovenox (script was provided by surgeon). Pt states that he has specific dosing instructions at home and will call with those instructions   []   [x]       Missed Dose - denies   []   [x]       Extra Dose - denies   []   [x]       Change in medications- no changes    []   [x]       Change in health/diet/appetite- has greens twice weekly- broccoli and spinach and peas and sometimes iceburg lettuce, really likes dark green leafy vegetables- explained need for consistency---> continue with greens --> patient to increase to 2 days of greens per week--> eating greens 3 times a week and plans to continue, no NVD---> had tomatoes and asparagus once and broccoli once in the last week --> no changes  []   [x]       Change in alcohol use- has a glass of wine occasionally --> has a beer once a month --> no alcohol---> nothing for a few days     []   [x]       Change in activity  []   [x]       Hospital admission  []   [x]       Emergency department visit  []   [x]       Other complaints    Clinical Outcomes:  Yes     No  []   [x]       Major bleeding event  []   [x]       Thromboembolic event    Duration of warfarin Therapy: indefinite   INR Range:  2.0-3.0     Has 10mg tablets of warfarin and uses a pillbox and takes in the morning.      INR today is 2.6  Continue weekly dose of 5mg on Sun and Tues and 10mg all other days. Begin holding warfarin 5 days prior to procedure. Resume warfarin taking 15 mg for 2 days then return to normal weekly dose above.  Pt will call clinic with dosing instructions provided by MD in order to provide resuming instructions and RTC date   Encouraged to maintain a consistency of vegetables/salads  Recheck INR in 5 weeks, 2/15, after surgery     Patient consent signed 1/10/23    Referring is Dr. Daniele Hoffman   INR (no units)   Date Value   2020 2.90   2020 2.30   2020 2.20     INR,(POC) (no units)   Date Value   2022 2.5   2022 2.1   10/10/2022 2.3   2022 1.7     For Pharmacy Admin Tracking Only    Intervention Detail: Adherence Monitorin and Dose Adjustment: 1, reason: Therapy Optimization  Total # of Interventions Recommended: 2  Total # of Interventions Accepted: 2  Time Spent (min): 20

## 2023-02-15 ENCOUNTER — TELEPHONE (OUTPATIENT)
Dept: PHARMACY | Age: 73
End: 2023-02-15

## 2023-02-15 NOTE — TELEPHONE ENCOUNTER
Called patient to discuss missed apt, procedure, bridging and dosing and to RS him. Line was busy.      Judge Ca, PharmD, [facility] Tracking Only    Intervention Detail: Adherence Monitorin  Total # of Interventions Recommended: 1  Total # of Interventions Accepted: 1  Time Spent (min): 15

## 2023-02-17 ENCOUNTER — TELEPHONE (OUTPATIENT)
Dept: PHARMACY | Age: 73
End: 2023-02-17

## 2023-02-17 ENCOUNTER — ANTI-COAG VISIT (OUTPATIENT)
Dept: PHARMACY | Age: 73
End: 2023-02-17
Payer: COMMERCIAL

## 2023-02-17 DIAGNOSIS — I82.409 DEEP VEIN THROMBOSIS (DVT) OF LOWER EXTREMITY, UNSPECIFIED CHRONICITY, UNSPECIFIED LATERALITY, UNSPECIFIED VEIN (HCC): Primary | ICD-10-CM

## 2023-02-17 DIAGNOSIS — I26.99 PE (PULMONARY THROMBOEMBOLISM) (HCC): ICD-10-CM

## 2023-02-17 LAB — INTERNATIONAL NORMALIZATION RATIO, POC: 1.5

## 2023-02-17 PROCEDURE — 99212 OFFICE O/P EST SF 10 MIN: CPT

## 2023-02-17 PROCEDURE — 85610 PROTHROMBIN TIME: CPT

## 2023-02-17 NOTE — PROGRESS NOTES
Mr. Liyah Holland is a 71 y.o. y/o male with history of DVT who presents today for anticoagulation monitoring and adjustment. Pertinent PMH: Has been on warfarin since 2015. Hx patient of [facility] clinic  Patient Reported Findings:  Yes     No  [x]   []       Patient verifies current dosing regimen as listed- warfarin 5 mg Sun & Tues and 10 mg all other days---> confirms dose   []   [x]       S/S bleeding/bruising/swelling/SOB- denies   []   [x]       Blood in urine or stool-denies   [x]   []       Procedures scheduled in the future at this time  is having vascular surgery on 2/10, was told to hold warfarin 5 days prior and bridge with lovenox (script was provided by surgeon). Pt states that he has specific dosing instructions at home and will call with those instructions   []   [x]       Missed Dose - denies   []   [x]       Extra Dose - denies   []   [x]       Change in medications- no changes    []   [x]       Change in health/diet/appetite- has greens twice weekly- broccoli and spinach and peas and sometimes iceburg lettuce, really likes dark green leafy vegetables- explained need for consistency---> continue with greens --> patient to increase to 2 days of greens per week--> eating greens 3 times a week and plans to continue, no NVD---> had tomatoes and asparagus once and broccoli once in the last week --> no changes  []   [x]       Change in alcohol use- has a glass of wine occasionally --> has a beer once a month --> no alcohol---> nothing for a few days     []   [x]       Change in activity  []   [x]       Hospital admission  []   [x]       Emergency department visit  []   [x]       Other complaints    Clinical Outcomes:  Yes     No  []   [x]       Major bleeding event  []   [x]       Thromboembolic event    Duration of warfarin Therapy: indefinite   INR Range:  2.0-3.0     Has 10mg tablets of warfarin and uses a pillbox and takes in the morning.      INR today is 1.5  Procedure was changed to , held 5 days before and resumed . Took 10mg daily. States he stopped shots on Sat morning (on his own despite previous instruction). Explained that normally these shots are continued until INR is within goal range to prevent clots/strokes etc. Thinks he has a whole box left of the shots at home. Would recommend continuing until INR 2-3 but ultimately up to patient (aware of risks). Pt more concerned with bleeding and most likely will not be resuming shots. Reviewed administration technique as pt injecting at an angle. Take 15mg tomorrow, 10mg  and Monday and recheck Tuesday. Would recommend continuing lovenox shots twice daily until INR is therapeutic (2-3).    Encouraged to maintain a consistency of vegetables/salads  Recheck INR on     Patient consent signed 1/10/23    Referring is Dr. Jania Sims   INR (no units)   Date Value   2020 2.90   2020 2.30   2020 2.20     INR,(POC) (no units)   Date Value   2023 1.5   01/10/2023 2.6   2022 2.5   2022 2.1     For Pharmacy Admin Tracking Only    Intervention Detail: Adherence Monitorin and Dose Adjustment: 1, reason: Therapy Optimization  Total # of Interventions Recommended: 2  Total # of Interventions Accepted: 2  Time Spent (min): 20

## 2023-02-21 ENCOUNTER — ANTI-COAG VISIT (OUTPATIENT)
Dept: PHARMACY | Age: 73
End: 2023-02-21
Payer: COMMERCIAL

## 2023-02-21 DIAGNOSIS — I26.99 PE (PULMONARY THROMBOEMBOLISM) (HCC): ICD-10-CM

## 2023-02-21 DIAGNOSIS — I82.409 DEEP VEIN THROMBOSIS (DVT) OF LOWER EXTREMITY, UNSPECIFIED CHRONICITY, UNSPECIFIED LATERALITY, UNSPECIFIED VEIN (HCC): Primary | ICD-10-CM

## 2023-02-21 LAB — INTERNATIONAL NORMALIZATION RATIO, POC: 2.9

## 2023-02-21 PROCEDURE — 85610 PROTHROMBIN TIME: CPT

## 2023-02-21 PROCEDURE — 99212 OFFICE O/P EST SF 10 MIN: CPT

## 2023-02-21 NOTE — PROGRESS NOTES
Mr. Adriana Hu is a 71 y.o. y/o male with history of DVT who presents today for anticoagulation monitoring and adjustment. Pertinent PMH: Has been on warfarin since 2015. Hx patient of [facility] clinic  Patient Reported Findings:  Yes     No  [x]   []       Patient verifies current dosing regimen as listed- warfarin 5 mg Sun & Tues and 10 mg all other days---> confirms dose---> self adjusted dose, 15mg x3 days then 10mg x2 days   []   [x]       S/S bleeding/bruising/swelling/SOB- denies   []   [x]       Blood in urine or stool-denies   [x]   []       Procedures scheduled in the future at this time  is having vascular surgery on 2/10, was told to hold warfarin 5 days prior and bridge with lovenox (script was provided by surgeon).  Pt states that he has specific dosing instructions at home and will call with those instructions   []   [x]       Missed Dose - denies   []   [x]       Extra Dose - pt significantly self adjusted dose to a much higher dose, explained risk    []   [x]       Change in medications- no changes    []   [x]       Change in health/diet/appetite- has greens twice weekly- broccoli and spinach and peas and sometimes iceburg lettuce, really likes dark green leafy vegetables- explained need for consistency---> continue with greens --> patient to increase to 2 days of greens per week--> eating greens 3 times a week and plans to continue, no NVD---> had tomatoes and asparagus once and broccoli once in the last week --> no changes  []   [x]       Change in alcohol use- has a glass of wine occasionally --> has a beer once a month --> no alcohol---> nothing for a few days     []   [x]       Change in activity  []   [x]       Hospital admission  []   [x]       Emergency department visit  []   [x]       Other complaints    Clinical Outcomes:  Yes     No  []   [x]       Major bleeding event  []   [x]       Thromboembolic event    Duration of warfarin Therapy: indefinite   INR Range:  2.0-3.0 Has 10mg tablets of warfarin and uses a pillbox and takes in the morning. Pt self adjusts dose frequently. INR today is 2.9 after self adjusting dose. Pt took 15mg x3 days then 10mg x2 days (including today), did not do lovenox injections as directed. Explained risks of self adjusting and concerns with INR jumping from 1.5 to 2.9. Asked to follow AVS in future. Take 5mg Sun and Wed and 10mg all other days. Encouraged to maintain a consistency of vegetables/salads  Will need RF at next visit.    Recheck INR in 2 weeks, 3/7    Patient consent signed 1/10/23    Referring is Dr. Yaya Atkinson   INR (no units)   Date Value   2020 2.90   2020 2.30   2020 2.20     INR,(POC) (no units)   Date Value   2023 2.9   2023 1.5   01/10/2023 2.6   2022 2.5     For Pharmacy Admin Tracking Only    Intervention Detail: Adherence Monitorin and Dose Adjustment: 1, reason: Therapy Optimization  Total # of Interventions Recommended: 2  Total # of Interventions Accepted: 2  Time Spent (min): 20

## 2023-03-07 ENCOUNTER — ANTI-COAG VISIT (OUTPATIENT)
Dept: PHARMACY | Age: 73
End: 2023-03-07
Payer: COMMERCIAL

## 2023-03-07 DIAGNOSIS — I82.409 DEEP VEIN THROMBOSIS (DVT) OF LOWER EXTREMITY, UNSPECIFIED CHRONICITY, UNSPECIFIED LATERALITY, UNSPECIFIED VEIN (HCC): Primary | ICD-10-CM

## 2023-03-07 DIAGNOSIS — I26.99 PE (PULMONARY THROMBOEMBOLISM) (HCC): ICD-10-CM

## 2023-03-07 LAB — INTERNATIONAL NORMALIZATION RATIO, POC: 1.7

## 2023-03-07 PROCEDURE — 85610 PROTHROMBIN TIME: CPT

## 2023-03-07 PROCEDURE — 99212 OFFICE O/P EST SF 10 MIN: CPT

## 2023-03-07 NOTE — PROGRESS NOTES
Mr. Sophia Willis is a 71 y.o. y/o male with history of DVT who presents today for anticoagulation monitoring and adjustment. Pertinent PMH: Has been on warfarin since 2015. Hx patient of [facility] clinic  Patient Reported Findings:  Yes     No  [x]   []       Patient verifies current dosing regimen as listed- warfarin 5 mg Sun & Tues and 10 mg all other days---> confirms dose---> self adjusted dose, 15mg x3 days then 10mg x2 days ---> confirms   []   [x]       S/S bleeding/bruising/swelling/SOB- denies ---> did fall and had bruising, did not hit head  []   [x]       Blood in urine or stool-denies   [x]   []       Procedures scheduled in the future at this time  is having vascular surgery on 2/10, was told to hold warfarin 5 days prior and bridge with lovenox (script was provided by surgeon).  Pt states that he has specific dosing instructions at home and will call with those instructions   []   [x]       Missed Dose - denies   []   [x]       Extra Dose - denies     []   [x]       Change in medications- no changes    []   [x]       Change in health/diet/appetite- has greens twice weekly- broccoli and spinach and peas and sometimes iceburg lettuce, really likes dark green leafy vegetables- explained need for consistency---> continue with greens --> patient to increase to 2 days of greens per week--> eating greens 3 times a week and plans to continue, no NVD---> had tomatoes and asparagus once and broccoli once in the last week --> lots of [employer] salad on Saturday   []   [x]       Change in alcohol use- has a glass of wine occasionally --> has a beer once a month --> no alcohol---> nothing for a few days     []   [x]       Change in activity  []   [x]       Hospital admission  []   [x]       Emergency department visit  []   [x]       Other complaints    Clinical Outcomes:  Yes     No  []   [x]       Major bleeding event  []   [x]       Thromboembolic event    Duration of warfarin Therapy: indefinite INR Range:  2.0-3.0     Has 10mg tablets of warfarin and uses a pillbox and takes in the morning. Pt self adjusts dose frequently. INR today is 1.7 dt unknown etiology. Increase dose to 5mg on Sundays only and 10mg all other days (8.3%).   Encouraged to maintain a consistency of vegetables/salads  RF called into [facility]  Recheck INR in 2 weeks, 3/21    Patient consent signed 1/10/23    Referring is Dr. Teresa Nance   INR (no units)   Date Value   05/29/2020 2.90   04/28/2020 2.30   03/31/2020 2.20     INR,(POC) (no units)   Date Value   03/07/2023 1.7   02/21/2023 2.9   02/17/2023 1.5   01/10/2023 2.6     For Pharmacy Admin Tracking Only    Intervention Detail: Dose Adjustment: 1, reason: Therapy Optimization and Refill(s) Provided  Total # of Interventions Recommended: 2  Total # of Interventions Accepted: 2  Time Spent (min): 15

## 2023-03-21 ENCOUNTER — TELEPHONE (OUTPATIENT)
Dept: PHARMACY | Age: 73
End: 2023-03-21

## 2023-03-22 ENCOUNTER — ANTI-COAG VISIT (OUTPATIENT)
Dept: PHARMACY | Age: 73
End: 2023-03-22
Payer: COMMERCIAL

## 2023-03-22 DIAGNOSIS — I26.99 PE (PULMONARY THROMBOEMBOLISM) (HCC): ICD-10-CM

## 2023-03-22 DIAGNOSIS — I82.409 DEEP VEIN THROMBOSIS (DVT) OF LOWER EXTREMITY, UNSPECIFIED CHRONICITY, UNSPECIFIED LATERALITY, UNSPECIFIED VEIN (HCC): Primary | ICD-10-CM

## 2023-03-22 LAB — INTERNATIONAL NORMALIZATION RATIO, POC: 2.5

## 2023-03-22 PROCEDURE — 85610 PROTHROMBIN TIME: CPT

## 2023-03-22 PROCEDURE — 99211 OFF/OP EST MAY X REQ PHY/QHP: CPT

## 2023-03-22 NOTE — PROGRESS NOTES
Emergency department visit  []   [x]       Other complaints    Clinical Outcomes:  Yes     No  []   [x]       Major bleeding event  []   [x]       Thromboembolic event    Duration of warfarin Therapy: indefinite   INR Range:  2.0-3.0     Has 10mg tablets of warfarin and uses a pillbox and takes in the morning. Pt self adjusts dose frequently. INR today is 2.5. Instructed pt to increase dose at last apt, but pt continued taking 5 mg on Sun and Wed, so unable to assess dose increase. Continue weekly dose of 5mg on Sundays and  and 10mg all other days as patient has been taking for past few weeks .   Encouraged to maintain a consistency of vegetables/salads  Recheck INR in 2 weeks,     Patient consent signed 1/10/23    Referring is Dr. Jojo Mckeon   INR (no units)   Date Value   2020 2.90   2020 2.30   2020 2.20     INR,(POC) (no units)   Date Value   2023 2.5   2023 1.7   2023 2.9   2023 1.5     For Pharmacy Admin Tracking Only    Intervention Detail: Adherence Monitorin  Total # of Interventions Recommended: 1  Total # of Interventions Accepted: 1  Time Spent (min): 15

## 2023-04-05 ENCOUNTER — ANTI-COAG VISIT (OUTPATIENT)
Dept: PHARMACY | Age: 73
End: 2023-04-05
Payer: COMMERCIAL

## 2023-04-05 DIAGNOSIS — I26.99 PE (PULMONARY THROMBOEMBOLISM) (HCC): ICD-10-CM

## 2023-04-05 DIAGNOSIS — I82.409 DEEP VEIN THROMBOSIS (DVT) OF LOWER EXTREMITY, UNSPECIFIED CHRONICITY, UNSPECIFIED LATERALITY, UNSPECIFIED VEIN (HCC): Primary | ICD-10-CM

## 2023-04-05 LAB — INTERNATIONAL NORMALIZATION RATIO, POC: 2.8

## 2023-04-05 PROCEDURE — 99211 OFF/OP EST MAY X REQ PHY/QHP: CPT

## 2023-04-05 PROCEDURE — 85610 PROTHROMBIN TIME: CPT

## 2023-04-05 NOTE — PROGRESS NOTES
Mr. Flower Duffy is a 71 y.o. y/o male with history of DVT who presents today for anticoagulation monitoring and adjustment. Pertinent PMH: Has been on warfarin since 2015. Hx patient of [facility] clinic  Patient Reported Findings:  Yes     No  [x]   []       Patient verifies current dosing regimen as listed- warfarin 5 mg Sun & Tues and 10 mg all other days---> confirms dose---> self adjusted dose, 15mg x3 days then 10mg x2 days ---> confirms --> Pt states that he took 5 mg on Sun and Wed and 10 mg all other days of the week. Also states that he used AVS. Clarified that patient went off memory, not using AVS --> verified dose   []   [x]       S/S bleeding/bruising/swelling/SOB- denies ---> did fall and had bruising, did not hit head --> denies  []   [x]       Blood in urine or stool-denies   []   [x]       Procedures scheduled in the future at this time  is having vascular surgery on 2/10, was told to hold warfarin 5 days prior and bridge with lovenox (script was provided by surgeon).  Pt states that he has specific dosing instructions at home and will call with those instructions --> denies  []   [x]       Missed Dose - denies   []   [x]       Extra Dose - denies     []   [x]       Change in medications- no changes    [x]   []       Change in health/diet/appetite- has greens twice weekly- broccoli and spinach and peas and sometimes iceburg lettuce, really likes dark green leafy vegetables- explained need for consistency---> continue with greens --> patient to increase to 2 days of greens per week--> eating greens 3 times a week and plans to continue, no NVD---> had tomatoes and asparagus once and broccoli once in the last week --> lots of [employer] salad on Saturday --> has spinach in past week --> little vit k   []   [x]       Change in alcohol use- has a glass of wine occasionally --> has a beer once a month --> no alcohol---> nothing for a few days     []   [x]       Change in activity  []   [x]

## 2023-04-26 ENCOUNTER — ANTI-COAG VISIT (OUTPATIENT)
Dept: PHARMACY | Age: 73
End: 2023-04-26
Payer: COMMERCIAL

## 2023-04-26 DIAGNOSIS — I26.99 PE (PULMONARY THROMBOEMBOLISM) (HCC): ICD-10-CM

## 2023-04-26 DIAGNOSIS — I82.409 DEEP VEIN THROMBOSIS (DVT) OF LOWER EXTREMITY, UNSPECIFIED CHRONICITY, UNSPECIFIED LATERALITY, UNSPECIFIED VEIN (HCC): Primary | ICD-10-CM

## 2023-04-26 LAB — INTERNATIONAL NORMALIZATION RATIO, POC: 2.8

## 2023-04-26 PROCEDURE — 99211 OFF/OP EST MAY X REQ PHY/QHP: CPT

## 2023-04-26 PROCEDURE — 85610 PROTHROMBIN TIME: CPT

## 2023-04-26 NOTE — PROGRESS NOTES
activity  []   [x]       Hospital admission  []   [x]       Emergency department visit  []   [x]       Other complaints    Clinical Outcomes:  Yes     No  []   [x]       Major bleeding event  []   [x]       Thromboembolic event    Duration of warfarin Therapy: indefinite   INR Range:  2.0-3.0     Has 10mg tablets of warfarin and uses a pillbox and takes in the morning. Pt self adjusts dose frequently.      INR today is 2.8 again   Continue weekly dose of 5mg on Sundays and Wednesdays and 10mg all other days   Encouraged to maintain a consistency of vegetables/salads  Recheck INR in 4 weeks, 5/24    Patient consent signed 1/10/23    Referring is Dr. Bella Kumar   INR (no units)   Date Value   05/29/2020 2.90   04/28/2020 2.30   03/31/2020 2.20     INR,(POC) (no units)   Date Value   04/26/2023 2.8   04/05/2023 2.8   03/22/2023 2.5   03/07/2023 1.7     For Pharmacy Admin Tracking Only    Total # of Interventions Recommended: 0  Total # of Interventions Accepted: 0  Time Spent (min): 15

## 2023-05-24 ENCOUNTER — ANTI-COAG VISIT (OUTPATIENT)
Dept: PHARMACY | Age: 73
End: 2023-05-24
Payer: COMMERCIAL

## 2023-05-24 DIAGNOSIS — I82.409 DEEP VEIN THROMBOSIS (DVT) OF LOWER EXTREMITY, UNSPECIFIED CHRONICITY, UNSPECIFIED LATERALITY, UNSPECIFIED VEIN (HCC): Primary | ICD-10-CM

## 2023-05-24 DIAGNOSIS — I26.99 PE (PULMONARY THROMBOEMBOLISM) (HCC): ICD-10-CM

## 2023-05-24 LAB — INTERNATIONAL NORMALIZATION RATIO, POC: 2.9

## 2023-05-24 PROCEDURE — 99211 OFF/OP EST MAY X REQ PHY/QHP: CPT

## 2023-05-24 PROCEDURE — 85610 PROTHROMBIN TIME: CPT

## 2023-05-24 NOTE — PROGRESS NOTES
Mr. Zach Griffin is a 71 y.o. y/o male with history of DVT who presents today for anticoagulation monitoring and adjustment. Pertinent PMH: Has been on warfarin since 2015. Hx patient of [facility] clinic  Patient Reported Findings:  Yes     No  [x]   []       Patient verifies current dosing regimen as listed- warfarin 5 mg Sun & Tues and 10 mg all other days---> confirms dose---> self adjusted dose, 15mg x3 days then 10mg x2 days ---> confirms --> Pt states that he took 5 mg on Sun and Wed and 10 mg all other days of the week. Also states that he used AVS. Clarified that patient went off memory, not using AVS --> verified dose   []   [x]       S/S bleeding/bruising/swelling/SOB- denies ---> did fall and had bruising, did not hit head --> denies  []   [x]       Blood in urine or stool-denies   []   [x]       Procedures scheduled in the future at this time  is having vascular surgery on 2/10, was told to hold warfarin 5 days prior and bridge with lovenox (script was provided by surgeon).  Pt states that he has specific dosing instructions at home and will call with those instructions --> denies  []   [x]       Missed Dose - denies   []   [x]       Extra Dose - denies     []   [x]       Change in medications- no changes    [x]   []       Change in health/diet/appetite- has greens twice weekly- broccoli and spinach and peas and sometimes iceburg lettuce, really likes dark green leafy vegetables- explained need for consistency---> continue with greens --> patient to increase to 2 days of greens per week--> eating greens 3 times a week and plans to continue, no NVD---> had tomatoes and asparagus once and broccoli once in the last week --> lots of [employer] salad on Saturday --> has spinach in past week --> little vit k---> once/week---> less greens     []   [x]       Change in alcohol use- has a glass of wine occasionally --> has a beer once a month --> no alcohol---> nothing for a few days     []   [x]

## 2023-06-21 ENCOUNTER — ANTI-COAG VISIT (OUTPATIENT)
Dept: PHARMACY | Age: 73
End: 2023-06-21
Payer: COMMERCIAL

## 2023-06-21 DIAGNOSIS — I26.99 PE (PULMONARY THROMBOEMBOLISM) (HCC): ICD-10-CM

## 2023-06-21 DIAGNOSIS — I82.409 DEEP VEIN THROMBOSIS (DVT) OF LOWER EXTREMITY, UNSPECIFIED CHRONICITY, UNSPECIFIED LATERALITY, UNSPECIFIED VEIN (HCC): Primary | ICD-10-CM

## 2023-06-21 LAB — INTERNATIONAL NORMALIZATION RATIO, POC: 2.7

## 2023-06-21 PROCEDURE — 99211 OFF/OP EST MAY X REQ PHY/QHP: CPT

## 2023-06-21 PROCEDURE — 85610 PROTHROMBIN TIME: CPT

## 2023-06-21 NOTE — PROGRESS NOTES
Mr. Romayne Champ is a 71 y.o. y/o male with history of DVT who presents today for anticoagulation monitoring and adjustment. Pertinent PMH: Has been on warfarin since 2015. Hx patient of [facility] clinic  Patient Reported Findings:  Yes     No  [x]   []       Patient verifies current dosing regimen as listed- warfarin 5 mg Sun & Tues and 10 mg all other days---> confirms dose---> self adjusted dose, 15mg x3 days then 10mg x2 days ---> confirms --> Pt states that he took 5 mg on Sun and Wed and 10 mg all other days of the week. Also states that he used AVS. Clarified that patient went off memory, not using AVS --> verified dose   []   [x]       S/S bleeding/bruising/swelling/SOB- denies ---> did fall and had bruising, did not hit head --> denies  []   [x]       Blood in urine or stool-denies   []   [x]       Procedures scheduled in the future at this time  is having vascular surgery on 2/10, was told to hold warfarin 5 days prior and bridge with lovenox (script was provided by surgeon).  Pt states that he has specific dosing instructions at home and will call with those instructions --> denies  []   [x]       Missed Dose - denies   []   [x]       Extra Dose - denies     []   [x]       Change in medications- no changes    []   [x]       Change in health/diet/appetite- has greens twice weekly- broccoli and spinach and peas and sometimes iceburg lettuce, really likes dark green leafy vegetables- explained need for consistency---> continue with greens --> patient to increase to 2 days of greens per week--> eating greens 3 times a week and plans to continue, no NVD---> had tomatoes and asparagus once and broccoli once in the last week --> lots of [employer] salad on Saturday --> has spinach in past week --> little vit k---> once/week---> less greens --> no changes    []   [x]       Change in alcohol use- has a glass of wine occasionally --> has a beer once a month --> no alcohol---> nothing for a few days

## 2023-07-19 ENCOUNTER — ANTI-COAG VISIT (OUTPATIENT)
Dept: PHARMACY | Age: 73
End: 2023-07-19
Payer: COMMERCIAL

## 2023-07-19 DIAGNOSIS — I82.409 DEEP VEIN THROMBOSIS (DVT) OF LOWER EXTREMITY, UNSPECIFIED CHRONICITY, UNSPECIFIED LATERALITY, UNSPECIFIED VEIN (HCC): Primary | ICD-10-CM

## 2023-07-19 DIAGNOSIS — I26.99 PE (PULMONARY THROMBOEMBOLISM) (HCC): ICD-10-CM

## 2023-07-19 LAB — INTERNATIONAL NORMALIZATION RATIO, POC: 3.4

## 2023-07-19 PROCEDURE — 85610 PROTHROMBIN TIME: CPT

## 2023-07-19 PROCEDURE — 99211 OFF/OP EST MAY X REQ PHY/QHP: CPT

## 2023-07-19 NOTE — PROGRESS NOTES
Mr. Donna Robledo is a 71 y.o. y/o male with history of DVT who presents today for anticoagulation monitoring and adjustment. Pertinent PMH: Has been on warfarin since 2015. Hx patient of [facility] clinic  Patient Reported Findings:  Yes     No  [x]   []       Patient verifies current dosing regimen as listed- warfarin 5 mg Sun & Tues and 10 mg all other days---> confirms dose---> self adjusted dose, 15mg x3 days then 10mg x2 days ---> confirms --> Pt states that he took 5 mg on Sun and Wed and 10 mg all other days of the week. Also states that he used AVS. Clarified that patient went off memory, not using AVS --> verified dose   []   [x]       S/S bleeding/bruising/swelling/SOB- denies ---> did fall and had bruising, did not hit head --> denies  []   [x]       Blood in urine or stool-denies   []   [x]       Procedures scheduled in the future at this time  is having vascular surgery on 2/10, was told to hold warfarin 5 days prior and bridge with lovenox (script was provided by surgeon).  Pt states that he has specific dosing instructions at home and will call with those instructions --> denies  []   [x]       Missed Dose - denies   []   [x]       Extra Dose - denies     []   [x]       Change in medications- no changes    []   [x]       Change in health/diet/appetite- has greens twice weekly- broccoli and spinach and peas and sometimes iceburg lettuce, really likes dark green leafy vegetables- explained need for consistency---> continue with greens --> patient to increase to 2 days of greens per week--> eating greens 3 times a week and plans to continue, no NVD---> had tomatoes and asparagus once and broccoli once in the last week --> lots of [employer] salad on Saturday --> has spinach in past week --> little vit k---> once/week---> less greens --> no changes    []   [x]       Change in alcohol use- has a glass of wine occasionally --> has a beer once a month --> no alcohol---> nothing for a few

## 2023-08-18 ENCOUNTER — TELEPHONE (OUTPATIENT)
Dept: PHARMACY | Age: 73
End: 2023-08-18

## 2023-08-22 ENCOUNTER — ANTI-COAG VISIT (OUTPATIENT)
Dept: PHARMACY | Age: 73
End: 2023-08-22
Payer: COMMERCIAL

## 2023-08-22 DIAGNOSIS — I82.409 DEEP VEIN THROMBOSIS (DVT) OF LOWER EXTREMITY, UNSPECIFIED CHRONICITY, UNSPECIFIED LATERALITY, UNSPECIFIED VEIN (HCC): Primary | ICD-10-CM

## 2023-08-22 DIAGNOSIS — I26.99 PE (PULMONARY THROMBOEMBOLISM) (HCC): ICD-10-CM

## 2023-08-22 LAB — INTERNATIONAL NORMALIZATION RATIO, POC: 2.6

## 2023-08-22 PROCEDURE — 99211 OFF/OP EST MAY X REQ PHY/QHP: CPT

## 2023-08-22 PROCEDURE — 85610 PROTHROMBIN TIME: CPT

## 2023-08-22 NOTE — PROGRESS NOTES
Mr. Frankey Lover is a 71 y.o. y/o male with history of DVT who presents today for anticoagulation monitoring and adjustment. Pertinent PMH: Has been on warfarin since 2015. Hx patient of [facility] clinic  Patient Reported Findings:  Yes     No  [x]   []       Patient verifies current dosing regimen as listed-  Pt states that he took 5 mg on Sun and Wed and 10 mg all other days of the week.  Also states that he used AVS. Clarified that patient went off memory, not using AVS --> verified dose   []   [x]       S/S bleeding/bruising/swelling/SOB- denies ---> did fall and had bruising, did not hit head --> denies  []   [x]       Blood in urine or stool-denies   []   [x]       Procedures scheduled in the future at this time denies upcoming   []   [x]       Missed Dose - denies   []   [x]       Extra Dose - denies     []   [x]       Change in medications- no changes    []   [x]       Change in health/diet/appetite- has greens twice weekly- broccoli and spinach and peas and sometimes iceburg lettuce, really likes dark green leafy vegetables- explained need for consistency---> continue with greens --> patient to increase to 2 days of greens per week--> eating greens 3 times a week and plans to continue, no NVD---> had tomatoes and asparagus once and broccoli once in the last week --> lots of [employer] salad on Saturday --> has spinach in past week --> little vit k---> once/week---> less greens --> no changes    []   [x]       Change in alcohol use- has a glass of wine occasionally --> has a beer once a month --> no alcohol---> nothing for a few days---> denies  []   [x]       Change in activity  []   [x]       Hospital admission  []   [x]       Emergency department visit  []   [x]       Other complaints    Clinical Outcomes:  Yes     No  []   [x]       Major bleeding event  []   [x]       Thromboembolic event    Duration of warfarin Therapy: indefinite   INR Range:  2.0-3.0     Has 10mg tablets of warfarin and

## 2023-09-19 ENCOUNTER — ANTI-COAG VISIT (OUTPATIENT)
Dept: PHARMACY | Age: 73
End: 2023-09-19
Payer: COMMERCIAL

## 2023-09-19 DIAGNOSIS — I82.409 DEEP VEIN THROMBOSIS (DVT) OF LOWER EXTREMITY, UNSPECIFIED CHRONICITY, UNSPECIFIED LATERALITY, UNSPECIFIED VEIN (HCC): Primary | ICD-10-CM

## 2023-09-19 DIAGNOSIS — I26.99 PE (PULMONARY THROMBOEMBOLISM) (HCC): ICD-10-CM

## 2023-09-19 LAB — INTERNATIONAL NORMALIZATION RATIO, POC: 2.7

## 2023-09-19 PROCEDURE — 99211 OFF/OP EST MAY X REQ PHY/QHP: CPT

## 2023-09-19 PROCEDURE — 85610 PROTHROMBIN TIME: CPT

## 2023-09-19 NOTE — PROGRESS NOTES
Mr. Gisela Hurt is a 71 y.o. y/o male with history of DVT who presents today for anticoagulation monitoring and adjustment. Pertinent PMH: Has been on warfarin since 2015. Hx patient of [facility] clinic  Patient Reported Findings:  Yes     No  [x]   []       Patient verifies current dosing regimen as listed-  Pt states that he took 5 mg on Sun and Wed and 10 mg all other days of the week.  Also states that he used AVS. Clarified that patient went off memory, not using AVS --> verified dose   []   [x]       S/S bleeding/bruising/swelling/SOB- denies ---> did fall and had bruising, did not hit head --> denies  []   [x]       Blood in urine or stool-denies   []   [x]       Procedures scheduled in the future at this time denies upcoming   []   [x]       Missed Dose - denies   []   [x]       Extra Dose - denies     []   [x]       Change in medications- no changes    []   [x]       Change in health/diet/appetite- has greens twice weekly- broccoli and spinach and peas and sometimes iceburg lettuce, really likes dark green leafy vegetables- explained need for consistency---> continue with greens --> patient to increase to 2 days of greens per week--> eating greens 3 times a week and plans to continue, no NVD---> had tomatoes and asparagus once and broccoli once in the last week --> lots of [employer] salad on Saturday --> has spinach in past week --> little vit k---> once/week---> less greens --> no changes    []   [x]       Change in alcohol use- has a glass of wine occasionally --> has a beer once a month --> no alcohol---> nothing for a few days---> denies  []   [x]       Change in activity  []   [x]       Hospital admission  []   [x]       Emergency department visit  []   [x]       Other complaints    Clinical Outcomes:  Yes     No  []   [x]       Major bleeding event  []   [x]       Thromboembolic event    Duration of warfarin Therapy: indefinite   INR Range:  2.0-3.0     Has 10mg tablets of warfarin and

## 2023-09-19 NOTE — TELEPHONE ENCOUNTER
Warfarin prescription phoned into [address] to [address] [facility] under Dr. Carter Goncalves  Warfarin 10 mg tabs  Take 5 mg (10 mg x 0.5) every Sun, Wed; 10 mg (10 mg x 1) all other days  90 days   2 refills    Josseline Jordan, SooD, [facility]

## 2023-10-17 ENCOUNTER — ANTI-COAG VISIT (OUTPATIENT)
Dept: PHARMACY | Age: 73
End: 2023-10-17
Payer: COMMERCIAL

## 2023-10-17 DIAGNOSIS — I82.409 DEEP VEIN THROMBOSIS (DVT) OF LOWER EXTREMITY, UNSPECIFIED CHRONICITY, UNSPECIFIED LATERALITY, UNSPECIFIED VEIN (HCC): Primary | ICD-10-CM

## 2023-10-17 DIAGNOSIS — I26.99 PE (PULMONARY THROMBOEMBOLISM) (HCC): ICD-10-CM

## 2023-10-17 LAB — INTERNATIONAL NORMALIZATION RATIO, POC: 2.9

## 2023-10-17 PROCEDURE — 99211 OFF/OP EST MAY X REQ PHY/QHP: CPT

## 2023-10-17 PROCEDURE — 85610 PROTHROMBIN TIME: CPT

## 2023-10-17 NOTE — PROGRESS NOTES
10mg tablets of warfarin and uses a pillbox and takes in the morning. Pt self adjusts dose frequently. ---> pt now taking 5mg every morning and 5mg in the evening (except on Sun and Wed), explained need to take whole dose at the same time every day     INR today is 2.9  Continue weekly dose of 5mg on Sundays and Wednesdays and 10mg all other days. Encouraged to maintain a consistency of vegetables/salads. RF called into [facility].   Recheck INR in 4 weeks, 11/14    Patient consent signed 1/10/23    Referring is Dr. Myriam Griffin   INR (no units)   Date Value   05/29/2020 2.90   04/28/2020 2.30   03/31/2020 2.20     INR,(POC) (no units)   Date Value   10/17/2023 2.9   09/19/2023 2.7   08/22/2023 2.6   07/19/2023 3.4     For Pharmacy Admin Tracking Only    Intervention Detail: Refill(s) Provided  Total # of Interventions Recommended: 1  Total # of Interventions Accepted: 1  Time Spent (min): 15

## 2023-11-16 ENCOUNTER — TELEPHONE (OUTPATIENT)
Dept: PHARMACY | Age: 73
End: 2023-11-16

## 2023-11-16 ENCOUNTER — ANTI-COAG VISIT (OUTPATIENT)
Dept: PHARMACY | Age: 73
End: 2023-11-16
Payer: COMMERCIAL

## 2023-11-16 DIAGNOSIS — I26.99 PE (PULMONARY THROMBOEMBOLISM) (HCC): ICD-10-CM

## 2023-11-16 DIAGNOSIS — I82.409 DEEP VEIN THROMBOSIS (DVT) OF LOWER EXTREMITY, UNSPECIFIED CHRONICITY, UNSPECIFIED LATERALITY, UNSPECIFIED VEIN (HCC): Primary | ICD-10-CM

## 2023-11-16 LAB — INTERNATIONAL NORMALIZATION RATIO, POC: 3.9

## 2023-11-16 PROCEDURE — 85610 PROTHROMBIN TIME: CPT

## 2023-11-16 PROCEDURE — 99212 OFFICE O/P EST SF 10 MIN: CPT

## 2023-11-16 NOTE — PROGRESS NOTES
Mr. Aranza Mccormick is a 68 y.o. y/o male with history of DVT who presents today for anticoagulation monitoring and adjustment. Pertinent PMH: Has been on warfarin since 2015. Hx patient of [facility] clinic  Patient Reported Findings:  Yes     No  [x]   []       Patient verifies current dosing regimen as listed-  Pt states that he took 5 mg on Sun and Wed and 10 mg all other days of the week. Also states that he used AVS. Clarified that patient went off memory, not using AVS --> verified dose   []   [x]       S/S bleeding/bruising/swelling/SOB- denies ---> did fall and had bruising, did not hit head --> denies  []   [x]       Blood in urine or stool-denies   []   [x]       Procedures scheduled in the future at this time denies upcoming   []   [x]       Missed Dose - denies   []   [x]       Extra Dose - denies     []   [x]       Change in medications- no changes --> had b12 inj.  And will be taking otc b12    []   [x]       Change in health/diet/appetite- has greens twice weekly- broccoli and spinach and peas and sometimes iceburg lettuce, really likes dark green leafy vegetables- explained need for consistency---> continue with greens --> patient to increase to 2 days of greens per week--> eating greens 3 times a week and plans to continue, no NVD---> had tomatoes and asparagus once and broccoli once in the last week --> lots of [employer] salad on Saturday --> has spinach in past week --> little vit k---> once/week---> less greens --> no changes    []   [x]       Change in alcohol use- has a glass of wine occasionally --> has a beer once a month --> no alcohol---> nothing for a few days---> denies  []   [x]       Change in activity  []   [x]       Hospital admission  []   [x]       Emergency department visit  []   [x]       Other complaints    Clinical Outcomes:  Yes     No  []   [x]       Major bleeding event  []   [x]       Thromboembolic event    Duration of warfarin Therapy: indefinite   INR Range:

## 2023-11-30 ENCOUNTER — ANTI-COAG VISIT (OUTPATIENT)
Dept: PHARMACY | Age: 73
End: 2023-11-30
Payer: MEDICARE

## 2023-11-30 DIAGNOSIS — I82.409 DEEP VEIN THROMBOSIS (DVT) OF LOWER EXTREMITY, UNSPECIFIED CHRONICITY, UNSPECIFIED LATERALITY, UNSPECIFIED VEIN (HCC): Primary | ICD-10-CM

## 2023-11-30 DIAGNOSIS — I26.99 PE (PULMONARY THROMBOEMBOLISM) (HCC): ICD-10-CM

## 2023-11-30 LAB — INTERNATIONAL NORMALIZATION RATIO, POC: 2.8

## 2023-11-30 PROCEDURE — 99211 OFF/OP EST MAY X REQ PHY/QHP: CPT

## 2023-11-30 PROCEDURE — 85610 PROTHROMBIN TIME: CPT

## 2023-11-30 NOTE — PROGRESS NOTES
Mr. Donna Robledo is a 68 y.o. y/o male with history of DVT who presents today for anticoagulation monitoring and adjustment. Pertinent PMH: Has been on warfarin since 2015. Hx patient of [facility] clinic  Patient Reported Findings:  Yes     No  [x]   []       Patient verifies current dosing regimen as listed-  Pt states that he took 5 mg on Sun and Wed and 10 mg all other days of the week. Also states that he used AVS. Clarified that patient went off memory, not using AVS --> verified dose   []   [x]       S/S bleeding/bruising/swelling/SOB- denies ---> did fall and had bruising, did not hit head --> denies  []   [x]       Blood in urine or stool-denies   []   [x]       Procedures scheduled in the future at this time denies upcoming   []   [x]       Missed Dose - denies   []   [x]       Extra Dose - denies     [x]   []       Change in medications- no changes --> had b12 inj.  And will be taking otc b12--> started OTC B12    [x]   []       Change in health/diet/appetite- has greens twice weekly- broccoli and spinach and peas and sometimes iceburg lettuce, really likes dark green leafy vegetables- explained need for consistency---> continue with greens --> patient to increase to 2 days of greens per week--> eating greens 3 times a week and plans to continue, no NVD---> had tomatoes and asparagus once and broccoli once in the last week --> lots of [employer] salad on Saturday --> has spinach in past week --> little vit k---> once/week---> less greens --> no changes --> had spinach over the holiday  []   [x]       Change in alcohol use- has a glass of wine occasionally --> has a beer once a month --> no alcohol---> nothing for a few days---> denies  []   [x]       Change in activity  []   [x]       Hospital admission  []   [x]       Emergency department visit  []   [x]       Other complaints    Clinical Outcomes:  Yes     No  []   [x]       Major bleeding event  []   [x]       Thromboembolic

## 2024-01-17 ENCOUNTER — TELEPHONE (OUTPATIENT)
Dept: PHARMACY | Age: 74
End: 2024-01-17

## 2024-01-17 NOTE — TELEPHONE ENCOUNTER
Pt called to cancel apt tomorrow and all future apts, states that received weird outrageous bills from [employer] and cannot afford to come back in. Discussed with patient, explained may have an error with billing etc, transferred patient to billing to discuss. He will call us back and decide if he needs to still cancel tomorrow.    Dana Carbajal, PharmD, [facility]    For Pharmacy Admin Tracking Only    Intervention Detail: Adherence Monitorin  Total # of Interventions Recommended: 1  Total # of Interventions Accepted: 1  Time Spent (min): 15

## 2024-01-25 ENCOUNTER — ANTI-COAG VISIT (OUTPATIENT)
Dept: PHARMACY | Age: 74
End: 2024-01-25

## 2024-01-25 DIAGNOSIS — I26.99 PE (PULMONARY THROMBOEMBOLISM) (HCC): Primary | ICD-10-CM

## 2024-01-25 PROBLEM — I82.409 DVT OF LEG (DEEP VENOUS THROMBOSIS) (HCC): Status: RESOLVED | Noted: 2020-03-12 | Resolved: 2024-01-25
